# Patient Record
Sex: FEMALE | Race: WHITE | Employment: OTHER | URBAN - METROPOLITAN AREA
[De-identification: names, ages, dates, MRNs, and addresses within clinical notes are randomized per-mention and may not be internally consistent; named-entity substitution may affect disease eponyms.]

---

## 2017-05-03 ENCOUNTER — HOSPITAL ENCOUNTER (OUTPATIENT)
Age: 72
Setting detail: OUTPATIENT SURGERY
Discharge: HOME | End: 2017-05-03
Attending: OPHTHALMOLOGY | Admitting: OPHTHALMOLOGY
Payer: MEDICARE

## 2017-05-03 ENCOUNTER — ANESTHESIA (OUTPATIENT)
Dept: SURGERY | Age: 72
End: 2017-05-03
Payer: MEDICARE

## 2017-05-03 ENCOUNTER — ANESTHESIA EVENT (OUTPATIENT)
Dept: SURGERY | Age: 72
End: 2017-05-03
Payer: MEDICARE

## 2017-05-03 VITALS
HEART RATE: 83 BPM | OXYGEN SATURATION: 97 % | HEIGHT: 65 IN | WEIGHT: 191.5 LBS | TEMPERATURE: 98 F | SYSTOLIC BLOOD PRESSURE: 113 MMHG | RESPIRATION RATE: 16 BRPM | BODY MASS INDEX: 31.9 KG/M2 | DIASTOLIC BLOOD PRESSURE: 45 MMHG

## 2017-05-03 PROCEDURE — 74011250636 HC RX REV CODE- 250/636

## 2017-05-03 PROCEDURE — 77030018606 HC TIP PHACO4 J&J -B: Performed by: OPHTHALMOLOGY

## 2017-05-03 PROCEDURE — 74011250636 HC RX REV CODE- 250/636: Performed by: OPHTHALMOLOGY

## 2017-05-03 PROCEDURE — 76060000031 HC ANESTHESIA FIRST 0.5 HR: Performed by: OPHTHALMOLOGY

## 2017-05-03 PROCEDURE — V2632 POST CHMBR INTRAOCULAR LENS: HCPCS | Performed by: OPHTHALMOLOGY

## 2017-05-03 PROCEDURE — 77030018846 HC SOL IRR STRL H20 ICUM -A: Performed by: OPHTHALMOLOGY

## 2017-05-03 PROCEDURE — 76010000154 HC OR TIME FIRST 0.5 HR: Performed by: OPHTHALMOLOGY

## 2017-05-03 PROCEDURE — 74011000250 HC RX REV CODE- 250: Performed by: OPHTHALMOLOGY

## 2017-05-03 PROCEDURE — 77030006704 HC BLD OPHTH SLT ALCN -B: Performed by: OPHTHALMOLOGY

## 2017-05-03 PROCEDURE — 76210000020 HC REC RM PH II FIRST 0.5 HR: Performed by: OPHTHALMOLOGY

## 2017-05-03 DEVICE — LENS INTRAOCULAR BCNVX +18.0 DIOPT 6X13 MM ASYM ACRYSOF: Type: IMPLANTABLE DEVICE | Site: EYE | Status: FUNCTIONAL

## 2017-05-03 RX ORDER — MIDAZOLAM HYDROCHLORIDE 1 MG/ML
INJECTION, SOLUTION INTRAMUSCULAR; INTRAVENOUS AS NEEDED
Status: DISCONTINUED | OUTPATIENT
Start: 2017-05-03 | End: 2017-05-03 | Stop reason: HOSPADM

## 2017-05-03 RX ORDER — SODIUM CHLORIDE, SODIUM LACTATE, POTASSIUM CHLORIDE, CALCIUM CHLORIDE 600; 310; 30; 20 MG/100ML; MG/100ML; MG/100ML; MG/100ML
75 INJECTION, SOLUTION INTRAVENOUS CONTINUOUS
Status: DISCONTINUED | OUTPATIENT
Start: 2017-05-03 | End: 2017-05-03 | Stop reason: HOSPADM

## 2017-05-03 RX ORDER — SODIUM CHLORIDE, SODIUM LACTATE, POTASSIUM CHLORIDE, CALCIUM CHLORIDE 600; 310; 30; 20 MG/100ML; MG/100ML; MG/100ML; MG/100ML
100 INJECTION, SOLUTION INTRAVENOUS CONTINUOUS
Status: CANCELLED | OUTPATIENT
Start: 2017-05-03

## 2017-05-03 RX ORDER — FENTANYL CITRATE 50 UG/ML
50 INJECTION, SOLUTION INTRAMUSCULAR; INTRAVENOUS
Status: CANCELLED | OUTPATIENT
Start: 2017-05-03

## 2017-05-03 RX ORDER — LIDOCAINE HYDROCHLORIDE 10 MG/ML
INJECTION, SOLUTION EPIDURAL; INFILTRATION; INTRACAUDAL; PERINEURAL AS NEEDED
Status: DISCONTINUED | OUTPATIENT
Start: 2017-05-03 | End: 2017-05-03 | Stop reason: HOSPADM

## 2017-05-03 RX ORDER — ONDANSETRON 2 MG/ML
4 INJECTION INTRAMUSCULAR; INTRAVENOUS ONCE
Status: CANCELLED | OUTPATIENT
Start: 2017-05-03 | End: 2017-05-03

## 2017-05-03 RX ORDER — TROPICAMIDE 10 MG/ML
1 SOLUTION/ DROPS OPHTHALMIC
Status: COMPLETED | OUTPATIENT
Start: 2017-05-03 | End: 2017-05-03

## 2017-05-03 RX ORDER — PHENYLEPHRINE HYDROCHLORIDE 25 MG/ML
1 SOLUTION/ DROPS OPHTHALMIC
Status: COMPLETED | OUTPATIENT
Start: 2017-05-03 | End: 2017-05-03

## 2017-05-03 RX ORDER — EPINEPHRINE 1 MG/ML
INJECTION, SOLUTION, CONCENTRATE INTRAVENOUS AS NEEDED
Status: DISCONTINUED | OUTPATIENT
Start: 2017-05-03 | End: 2017-05-03 | Stop reason: HOSPADM

## 2017-05-03 RX ORDER — NALOXONE HYDROCHLORIDE 0.4 MG/ML
0.4 INJECTION, SOLUTION INTRAMUSCULAR; INTRAVENOUS; SUBCUTANEOUS AS NEEDED
Status: CANCELLED | OUTPATIENT
Start: 2017-05-03

## 2017-05-03 RX ORDER — OXYCODONE AND ACETAMINOPHEN 5; 325 MG/1; MG/1
1 TABLET ORAL AS NEEDED
Status: CANCELLED | OUTPATIENT
Start: 2017-05-03

## 2017-05-03 RX ORDER — FLUMAZENIL 0.1 MG/ML
0.2 INJECTION INTRAVENOUS
Status: CANCELLED | OUTPATIENT
Start: 2017-05-03

## 2017-05-03 RX ADMIN — TROPICAMIDE 1 DROP: 10 SOLUTION/ DROPS OPHTHALMIC at 06:58

## 2017-05-03 RX ADMIN — PHENYLEPHRINE HYDROCHLORIDE 1 DROP: 2.5 SOLUTION/ DROPS OPHTHALMIC at 06:48

## 2017-05-03 RX ADMIN — PHENYLEPHRINE HYDROCHLORIDE 1 DROP: 2.5 SOLUTION/ DROPS OPHTHALMIC at 06:43

## 2017-05-03 RX ADMIN — TROPICAMIDE 1 DROP: 10 SOLUTION/ DROPS OPHTHALMIC at 06:43

## 2017-05-03 RX ADMIN — PHENYLEPHRINE HYDROCHLORIDE 1 DROP: 2.5 SOLUTION/ DROPS OPHTHALMIC at 06:58

## 2017-05-03 RX ADMIN — MIDAZOLAM HYDROCHLORIDE 1 MG: 1 INJECTION, SOLUTION INTRAMUSCULAR; INTRAVENOUS at 08:07

## 2017-05-03 RX ADMIN — LIDOCAINE HYDROCHLORIDE 2 DROP: 35 GEL OPHTHALMIC at 07:56

## 2017-05-03 RX ADMIN — MIDAZOLAM HYDROCHLORIDE 2 MG: 1 INJECTION, SOLUTION INTRAMUSCULAR; INTRAVENOUS at 08:13

## 2017-05-03 RX ADMIN — PHENYLEPHRINE HYDROCHLORIDE 1 DROP: 2.5 SOLUTION/ DROPS OPHTHALMIC at 06:53

## 2017-05-03 RX ADMIN — LIDOCAINE HYDROCHLORIDE 2 DROP: 35 GEL OPHTHALMIC at 06:58

## 2017-05-03 RX ADMIN — TROPICAMIDE 1 DROP: 10 SOLUTION/ DROPS OPHTHALMIC at 06:48

## 2017-05-03 RX ADMIN — TROPICAMIDE 1 DROP: 10 SOLUTION/ DROPS OPHTHALMIC at 06:53

## 2017-05-03 RX ADMIN — SODIUM CHLORIDE, SODIUM LACTATE, POTASSIUM CHLORIDE, AND CALCIUM CHLORIDE 75 ML/HR: 600; 310; 30; 20 INJECTION, SOLUTION INTRAVENOUS at 07:02

## 2017-05-03 RX ADMIN — MIDAZOLAM HYDROCHLORIDE 1 MG: 1 INJECTION, SOLUTION INTRAMUSCULAR; INTRAVENOUS at 08:08

## 2017-05-03 NOTE — ANESTHESIA POSTPROCEDURE EVALUATION
Post-Anesthesia Evaluation and Assessment    Patient: Bell Ness MRN: [id number]  SSN: [id number]    YOB: 1945  Age: 67 y.o. Sex: female       Cardiovascular Function/Vital Signs  Visit Vitals    /54    Pulse 83    Temp 36.8 °C (98.2 °F)    Resp 16    Ht 5' 5\" (1.651 m)    Wt 86.9 kg (191 lb 8 oz)    SpO2 96%    BMI 31.87 kg/m2       Patient is status post MAC anesthesia for Procedure(s):  CATARACT EXTRACTION WITH INTRA OCULAR LENS IMPLANT LEFT EYE. Nausea/Vomiting: None    Postoperative hydration reviewed and adequate. Pain:  Pain Scale 1: Numeric (0 - 10) (05/03/17 0831)  Pain Intensity 1: 0 (05/03/17 0831)   Managed    Neurological Status:   Neuro (WDL): Within Defined Limits (05/03/17 0831)  Neuro  LUE Motor Response: Purposeful (05/03/17 0831)  LLE Motor Response: Purposeful (05/03/17 0831)  RUE Motor Response: Purposeful (05/03/17 0831)  RLE Motor Response: Purposeful (05/03/17 0831)   At baseline    Mental Status and Level of Consciousness: Arousable    Pulmonary Status:   O2 Device: Room air (05/03/17 0845)   Adequate oxygenation and airway patent    Complications related to anesthesia: None    Post-anesthesia assessment completed.  No concerns      Signed By: Papa Jennings CRNA     May 3, 2017

## 2017-05-03 NOTE — PERIOP NOTES
PATIENT SUPPLIED POST-OP DROPS: ILEVRO, BESIVANCE, LOTEMAX 1 GTT EACH left EYE AT END OF CASE  SUNGLASSES APPLIED AT END OF CASE.

## 2017-05-03 NOTE — PERIOP NOTES
AVS med list reviewed and verified - no duplicates with EVITA Branch RN - common med side effects handout to pt - eye bag with 3 eye drops to pts

## 2017-05-03 NOTE — ANESTHESIA POSTPROCEDURE EVALUATION
Post-Anesthesia Evaluation & Assessment    Visit Vitals    /54    Pulse 83    Temp 36.8 °C (98.2 °F)    Resp 16    Ht 5' 5\" (1.651 m)    Wt 86.9 kg (191 lb 8 oz)    SpO2 96%    BMI 31.87 kg/m2       Nausea/Vomiting: Controlled. Post-operative hydration adequate. Pain Scale 1: Numeric (0 - 10) (05/03/17 0831)  Pain Intensity 1: 0 (05/03/17 0831)   Managed    Pain score at or below stated goal level. Mental status & Level of consciousness: alert and oriented x 3    Neurological status: moves all extremities, sensation grossly intact    Pulmonary status: airway patent, adequate oxygenation. Complications related to anesthesia: none    Patient has met all PACU discharge requirements.       Lu Patel DO

## 2017-05-03 NOTE — DISCHARGE INSTRUCTIONS
Post-Operative Cataract Instructions  [facility] INVASIVE SURGERY [facility]  Yessica Shah M.D. Bhavani Nascimento. Bambi Mejia M.D.  [address] Valent[address]ina, [facility]  [phone]      Diet  1. Resume normal diet. 2. Do not drink alcoholic beverages, including beer for 24 hours. Activity  1. Do not drive a car or operate any hazardous machinery the day of surgery. 2. You may resume normal activities as tolerated. 3. No bending or heavy lifting. 4. No reading or computer work after your surgery. You may watch TV. Wound Care  1. Anticipate that your eye will tear and water. 2. You may also experience a sensation of a foreign body, sand, or grit in the surgical eye, this is normal.  3. Do not touch the affected eye. 4. ** Do not remove eye shield unless directed to do so by your physician. **  5. Wear eye shield when resting or sleeping for one week. Medications  1. Take Tylenol Extra Strength two (2) tablets by mouth upon arrival home and then every four (4) hours as needed for discomfort. 2. Regarding Eye drops:  -  Begin using your eye drops as directed by your physician in the (left) operative eye. One drop of     []   Zymar    [x]  Vigamox   along with one (1) drop     []  Acular    [x]  Voltaren   every four (4) hours while awake. Wait five (5) minutes then apply one (1) drop     []  Pred Forte    [x]  Econopred Plus   every four (4) hours while awake. 3. If you take glaucoma medications, continue to do so unless the physician otherwise instructs you. 4. You may use artificial tears as needed if your eye feels scratchy. Notify your Physician Immediately for any of the followin. Excessive pain not relieved by Tylenol especially headache or increasing pressure to the operative eye. 2. Persistent nausea lasting more than eight hours. 3. If any vomiting occurs. If any questions or concerns arise, call your Surgeon at [phone].         DISCHARGE SUMMARY from Nurse    The following personal items are in your possession at time of discharge:    Dental Appliances: None  Visual Aid: None     Home Medications: Kept at bedside (pt supplied euye drops on bed to holding )  Jewelry: Ring (one ring in place on left hand waiver signed )  Clothing: Undergarments, Footwear, Shirt (placed in locker 9)  Other Valuables: None             PATIENT INSTRUCTIONS:    After general anesthesia or intravenous sedation, for 24 hours or while taking prescription Narcotics:  · Limit your activities  · Do not drive and operate hazardous machinery  · Do not make important personal or business decisions  · Do  not drink alcoholic beverages  · If you have not urinated within 8 hours after discharge, please contact your surgeon on call. Report the following to your surgeon:  · Excessive pain, swelling, redness or odor of or around the surgical area  · Temperature over 100.5  · Nausea and vomiting lasting longer than 4 hours or if unable to take medications  · Any signs of decreased circulation or nerve impairment to extremity: change in color, persistent  numbness, tingling, coldness or increase pain  · Any questions        What to do at Home:  Recommended activity: Ambulate in house,     If you experience any of the following symptoms above, please follow up with Dr. Mary Saavedra. *  Please give a list of your current medications to your Primary Care Provider. *  Please update this list whenever your medications are discontinued, doses are      changed, or new medications (including over-the-counter products) are added. *  Please carry medication information at all times in case of emergency situations. These are general instructions for a healthy lifestyle:    No smoking/ No tobacco products/ Avoid exposure to second hand smoke    Surgeon General's Warning:  Quitting smoking now greatly reduces serious risk to your health.     Obesity, smoking, and sedentary lifestyle greatly increases your risk for illness    A healthy diet, regular physical exercise & weight monitoring are important for maintaining a healthy lifestyle    You may be retaining fluid if you have a history of heart failure or if you experience any of the following symptoms:  Weight gain of 3 pounds or more overnight or 5 pounds in a week, increased swelling in our hands or feet or shortness of breath while lying flat in bed. Please call your doctor as soon as you notice any of these symptoms; do not wait until your next office visit. Recognize signs and symptoms of STROKE:    F-face looks uneven    A-arms unable to move or move unevenly    S-speech slurred or non-existent    T-time-call 911 as soon as signs and symptoms begin-DO NOT go       Back to bed or wait to see if you get better-TIME IS BRAIN. Warning Signs of HEART ATTACK     Call 911 if you have these symptoms:   Chest discomfort. Most heart attacks involve discomfort in the center of the chest that lasts more than a few minutes, or that goes away and comes back. It can feel like uncomfortable pressure, squeezing, fullness, or pain.  Discomfort in other areas of the upper body. Symptoms can include pain or discomfort in one or both arms, the back, neck, jaw, or stomach.  Shortness of breath with or without chest discomfort.  Other signs may include breaking out in a cold sweat, nausea, or lightheadedness. Don't wait more than five minutes to call 911 - MINUTES MATTER! Fast action can save your life. Calling 911 is almost always the fastest way to get lifesaving treatment. Emergency Medical Services staff can begin treatment when they arrive -- up to an hour sooner than if someone gets to the hospital by car. Patient armband removed and shredded    The discharge information has been reviewed with the patient and caregiver. The patient and caregiver verbalized understanding.     Discharge medications reviewed with the patient and caregiver and appropriate educational materials and side effects teaching were provided.

## 2017-05-03 NOTE — IP AVS SNAPSHOT
[employer] 
 
 
 [address] [facility] [address] 
[phone] Patient: Ewa Gonzalez MRN: [id number] :1945 You are allergic to the following No active allergies Recent Documentation Height Weight BMI OB Status Smoking Status 1.651 m 86.9 kg 31.87 kg/m2 Postmenopausal Never Smoker Emergency Contacts Name Discharge Info Relation Home Work Mobile Jesse Tamez DISCHARGE CAREGIVER [3] Spouse [3] [phone] About your hospitalization You were admitted on:  May 3, 2017 You last received care in the:  [address] [place] [address] You were discharged on:  May 3, 2017 Unit phone number:  [phone] Why you were hospitalized Your primary diagnosis was:  Not on File Providers Seen During Your Hospitalizations Provider Role Specialty Primary office phone Torres Garner MD Attending Provider Ophthalmology [phone] Your Primary Care Physician (PCP) Primary Care Physician Office Phone Office Fax [address] [facility], [address] [place] [address] [phone] Follow-up Information Follow up With Details Comments Contact Info [place], [address] 
[phone] Torres Garner MD Follow up in 1 day(s)  [address] [facility] [address] [place] [address] 
[phone] Current Discharge Medication List  
  
CONTINUE these medications which have NOT CHANGED Dose & Instructions Dispensing Information Comments Morning Noon Evening Bedtime ADVIL 200 mg tablet Generic drug:  ibuprofen Your last dose was: Your next dose is:    
   
   
 Dose:  400 mg Take 400 mg by mouth daily (with lunch). Refills:  0  
     
   
   
   
  
 cholecalciferol 1,000 unit Cap Commonly known as:  VITAMIN D3 Your last dose was: Your next dose is: Dose:  1000 Units Take 1,000 Units by mouth three (3) times daily. Refills:  0  
     
   
   
   
  
 doxycycline monohydrate 40 mg capsule Your last dose was: Your next dose is:    
   
   
 Dose:  40 mg Take 40 mg by mouth daily (after lunch). Refills:  0  
     
   
   
   
  
 FISH OIL 1,000 mg Cap Generic drug:  omega-3 fatty acids-vitamin e Your last dose was: Your next dose is:    
   
   
 Dose:  1 Cap Take 1 Cap by mouth three (3) times daily. Refills:  0  
     
   
   
   
  
 lisinopril 20 mg tablet Commonly known as:  Rj Rody Your last dose was: Your next dose is:    
   
   
 Dose:  20 mg Take 20 mg by mouth daily. Indications: Hypertension Refills:  0  
     
   
   
   
  
 melatonin 3 mg tablet Your last dose was: Your next dose is:    
   
   
 Dose:  3 mg Take 3 mg by mouth nightly. Refills:  0  
     
   
   
   
  
 meloxicam 15 mg tablet Commonly known as:  MOBIC Your last dose was: Your next dose is:    
   
   
 Dose:  15 mg Take 15 mg by mouth daily. Refills:  0 NORCO 5-325 mg per tablet Generic drug:  HYDROcodone-acetaminophen Your last dose was: Your next dose is:    
   
   
 Dose:  1 Tab Take 1 Tab by mouth as needed for Pain. Refills:  0  
     
   
   
   
  
 raloxifene 60 mg tablet Commonly known as:  EVISTA Your last dose was: Your next dose is:    
   
   
 Dose:  60 mg Take 60 mg by mouth daily. Refills:  0  
     
   
   
   
  
 simvastatin 20 mg tablet Commonly known as:  ZOCOR Your last dose was: Your next dose is:    
   
   
 Dose:  20 mg Take 20 mg by mouth nightly. Indications: hypercholesterolemia Refills:  0  
     
   
   
   
  
 traMADol 50 mg tablet Commonly known as:  ULTRAM  
   
Your last dose was:     
   
Your next dose is:    
   
   
 Dose:  50 mg  
 Take 50 mg by mouth every six (6) hours as needed for Pain. Refills:  0 Discharge Instructions Post-Operative Cataract Instructions [facility] [employer] Maria Luisa Miguel M.D. Shanna Mays. Dianna Evangelista M.D. 
[address] [facility] [address]. [address] 
[phone] Diet 1. Resume normal diet. 2. Do not drink alcoholic beverages, including beer for 24 hours. Activity 1. Do not drive a car or operate any hazardous machinery the day of surgery. 2. You may resume normal activities as tolerated. 3. No bending or heavy lifting. 4. No reading or computer work after your surgery. You may watch TV. Wound Care 1. Anticipate that your eye will tear and water. 2. You may also experience a sensation of a foreign body, sand, or grit in the surgical eye, this is normal. 
3. Do not touch the affected eye. 4. ** Do not remove eye shield unless directed to do so by your physician. ** 
5. Wear eye shield when resting or sleeping for one week. Medications 1. Take Tylenol Extra Strength two (2) tablets by mouth upon arrival home and then every four (4) hours as needed for discomfort. 2. Regarding Eye drops: 
-  Begin using your eye drops as directed by your physician in the (left) operative eye. One drop of        Zymar      Vigamox  
along with one (1) drop       Acular      Voltaren  
every four (4) hours while awake. Wait five (5) minutes then apply one (1) drop       Pred Forte      Econopred Plus  
every four (4) hours while awake. 3. If you take glaucoma medications, continue to do so unless the physician otherwise instructs you. 4. You may use artificial tears as needed if your eye feels scratchy. Notify your Physician Immediately for any of the followin. Excessive pain not relieved by Tylenol especially headache or increasing pressure to the operative eye. 2. Persistent nausea lasting more than eight hours. 3. If any vomiting occurs. If any questions or concerns arise, call your Surgeon at [phone]. DISCHARGE SUMMARY from Nurse The following personal items are in your possession at time of discharge: 
 
Dental Appliances: None Visual Aid: None Home Medications: Kept at bedside (pt supplied euye drops on bed to holding ) Jewelry: Ring (one ring in place on left hand waiver signed ) Clothing: Undergarments, Footwear, Shirt (placed in locker 9) Other Valuables: None PATIENT INSTRUCTIONS: 
 
 
F-face looks uneven A-arms unable to move or move unevenly S-speech slurred or non-existent T-time-call 911 as soon as signs and symptoms begin-DO NOT go Back to bed or wait to see if you get better-TIME IS BRAIN. Warning Signs of HEART ATTACK Call 911 if you have these symptoms: 
? Chest discomfort. Most heart attacks involve discomfort in the center of the chest that lasts more than a few minutes, or that goes away and comes back. It can feel like uncomfortable pressure, squeezing, fullness, or pain. ? Discomfort in other areas of the upper body. Symptoms can include pain or discomfort in one or both arms, the back, neck, jaw, or stomach. ? Shortness of breath with or without chest discomfort. ? Other signs may include breaking out in a cold sweat, nausea, or lightheadedness. Don't wait more than five minutes to call [address]! Fast action can save your life. Calling 911 is almost always the fastest way to get lifesaving treatment. Emergency Medical Services staff can begin treatment when they arrive  up to an hour sooner than if someone gets to the hospital by car. Patient armband removed and shredded The discharge information has been reviewed with the patient and caregiver. The patient and caregiver verbalized understanding. Discharge medications reviewed with the patient and caregiver and appropriate educational materials and side effects teaching were provided. Discharge Orders None Introducing [facility] SERVICES! Angelicacassidy Manuel introduces [employer] patient portal. Now you can access parts of your medical record, email your doctor's office, and request medication refills online. 1. In your internet browser, go to [email or web address] 2. Click on the First Time User? Click Here link in the Sign In box. You will see the New Member Sign Up page. 3. Enter your [employer] Access Code exactly as it appears below. You will not need to use this code after youve completed the sign-up process. If you do not sign up before the expiration date, you must request a new code. · [employer] Access Code: [email or web address] Expires: 7/27/2017  1:20 PM 
 
4. Enter the last four digits of your Social Security Number (xxxx) and Date of Birth (mm/dd/yyyy) as indicated and click Submit. You will be taken to the next sign-up page. 5. Create a [employer] ID. This will be your [employer] login ID and cannot be changed, so think of one that is secure and easy to remember. 6. Create a [employer] password. You can change your password at any time. 7. Enter your Password Reset Question and Answer. This can be used at a later time if you forget your password. 8. Enter your e-mail address. You will receive e-mail notification when new information is available in [address] 9. Click Sign Up. You can now view and download portions of your medical record. 10. Click the Download Summary menu link to download a portable copy of your medical information. If you have questions, please visit the Frequently Asked Questions section of the [employer] website.  Remember, [employer] is NOT to be used for urgent needs. For medical emergencies, dial 911. Now available from your iPhone and Android! General Information Please provide this summary of care documentation to your next provider. Patient Signature:  ____________________________________________________________ Date:  ____________________________________________________________  
  
Norman Mejía Provider Signature:  ____________________________________________________________ Date:  ____________________________________________________________

## 2017-05-03 NOTE — OP NOTES
Cataract Operative Note      Patient: Naida Valdez               Sex: female          DOA: 5/3/2017         YOB: 1945      Age:  67 y.o.        LOS:  LOS: 0 days     Preoperative Diagnosis: Cataract left eye    Postoperative Diagnosis:  Cataract  left eye  Surgeon: Sahra Miller MD, M.D. Anesthesia:  Topical anesthesia  Procedure:  Phacoemulsification of posterior chamber for intraocular lens implantation left    Fluids:  0    Procedure in Detail: The operative eye was prepped and draped in the usual fashion. A lid speculum was placed in the operative eye. A clear cornea approach was utilized. A paracentesis incision(s) was constructed with a 1 mm slit knife. One percent preservative-free lidocaine followed by viscoelastic was instilled into the anterior chamber. A clear corneal incision was made with a slit knife. A continuous curvilinear capsulorrhexis was constructed followed by hydrodissection. A phacoemulsification tip was placed into the eye, and the lens nucleus was emulsified. The irrigation/aspiration device was then used to remove any remaining cortical material.  Polishing of the capsule was performed as needed. The intraocular lens was then placed into the capsular bag after it was re-inflated with viscoelastic. The remaining viscoelastic was then removed using the irrigation/aspiration device. BSS on a cannula was then used to hydrate the wound edges. At the end of the procedure the wound was found to be watertight, the anterior chamber was deep and the pupil round. No blood loss during surgery. An antibiotic and anti-inflammatroy was placed into the operative eye. The lid speculum was removed. Protective sunglasses were then placed onto the patient. The patient was taken to the [address] [place] [address] Unit (PACU) in good condition having tolerated the procedure well. Estimated Blood Loss: 0                 Implants:   Implant Name Type Inv.  Item Serial No.  Lot No. LRB No. Used Action   LENS POST SGL PC 6 13 18.0 -- [employer] - [id number]   LENS POST SGL PC 6 13 18.0 -- [employer] [id number] [employer]   Left 1 Implanted       Specimens: * No specimens in log *            Complications:  None           Fior Nix MD, M.D.  [unfilled]  8:21 AM

## 2017-05-03 NOTE — INTERVAL H&P NOTE
H&P Update:  Elsy Little was seen and examined. History and physical has been reviewed. The patient has been examined.  There have been no significant clinical changes since the completion of the originally dated History and Physical.    Signed By: Shaila Phelan MD     May 3, 2017 7:25 AM

## 2017-05-03 NOTE — ANESTHESIA PREPROCEDURE EVALUATION
Anesthetic History   No history of anesthetic complications            Review of Systems / Medical History  Patient summary reviewed, nursing notes reviewed and pertinent labs reviewed    Pulmonary  Within defined limits                 Neuro/Psych   Within defined limits           Cardiovascular    Hypertension: well controlled          Hyperlipidemia  Pertinent negatives: No past MI, CAD, PAD, dysrhythmias, angina and CHF  Exercise tolerance: >4 METS     GI/Hepatic/Renal  Within defined limits              Endo/Other        Obesity and arthritis  Pertinent negatives: No diabetes, hypothyroidism, hyperthyroidism, morbid obesity and blood dyscrasia   Other Findings              Physical Exam    Airway  Mallampati: III  TM Distance: 4 - 6 cm  Neck ROM: decreased range of motion   Mouth opening: Diminished (comment)     Cardiovascular  Regular rate and rhythm,  S1 and S2 normal,  no murmur, click, rub, or gallop             Dental  No notable dental hx       Pulmonary  Breath sounds clear to auscultation               Abdominal  GI exam deferred       Other Findings            Anesthetic Plan    ASA: 2  Anesthesia type: MAC          Induction: Intravenous  Anesthetic plan and risks discussed with: Patient and Spouse

## 2023-01-20 ENCOUNTER — TELEPHONE (OUTPATIENT)
Dept: PHYSICAL THERAPY | Age: 78
End: 2023-01-20

## 2023-01-20 ENCOUNTER — HOSPITAL ENCOUNTER (OUTPATIENT)
Dept: PHYSICAL THERAPY | Age: 78
Discharge: HOME | End: 2023-01-20
Payer: MEDICARE

## 2023-01-20 PROCEDURE — 97161 PT EVAL LOW COMPLEX 20 MIN: CPT

## 2023-01-20 PROCEDURE — 97535 SELF CARE MNGMENT TRAINING: CPT

## 2023-01-20 PROCEDURE — 97110 THERAPEUTIC EXERCISES: CPT

## 2023-01-20 NOTE — PROGRESS NOTES
[facility] [place], [address] [place] [address]  Phone: [phone]   Fax: [phone]    Plan of Care/ Statement of Necessity for Physical Therapy Services     Patient name: Nishant Rizvi Start of Care: 2023   Referral source: Liat Reagan DO : 1945    Medical Diagnosis: Muscle weakness (generalized) [M62.81]  Other abnormalities of gait and mobility [R26.89]   Onset Date:2022    Treatment Diagnosis: muscle weakness, abnormal gait   Prior Hospitalization: see medical history Provider#: [id number]   Medications: Verified on Patient summary List    Comorbidities: OA, low back pain, right knee pain. Prior Level of Function:  regular 45 minutes walks. The Plan of Care and following information is based on the information from the initial evaluation. Assessment/ key information: Patient presents with c/o recently increasing LE weakness, declining mobility skills and decreasing sense of safety in mobility resulting in frequent \"furniture walking\" in home and decrease in ambulation outside of home due to increased fear of falls. Patient currently exhibits abnormal gait pattern, decreased bilateral LE strength, decreased balance reactions and report of unsteadiness with quick changes in head position. Patient will continue to benefit from skilled PT services to modify and progress therapeutic interventions, address functional mobility deficits, address ROM deficits, address strength deficits, analyze and cue movement patterns, analyze and modify body mechanics/ergonomics, assess and modify postural abnormalities, address imbalance/dizziness and instruct in home and community integration to attain remaining goals.     Evaluation Complexity History MEDIUM  Complexity : 1-2 comorbidities / personal factors will impact the outcome/ POC ; Examination LOW Complexity : 1-2 Standardized tests and measures addressing body structure, function, activity limitation and / or participation in recreation  ;Presentation LOW Complexity : Stable, uncomplicated  ;Clinical Decision Making MEDIUM Complexity : FOTO score of 26-74  Overall Complexity Rating: LOW   Problem List: pain affecting function, decrease ROM, decrease strength, impaired gait/ balance, decrease ADL/ functional abilitiies, decrease activity tolerance, decrease flexibility/ joint mobility, and decrease transfer abilities   Treatment Plan may include any combination of the following: Therapeutic exercise, Neuromuscular reeducation, Therapeutic activity, Self care/home management, and Gait training  Patient / Family readiness to learn indicated by: asking questions, trying to perform skills, and interest  Persons(s) to be included in education: patient (P)  Barriers to Learning/Limitations: None  Measures taken if barriers to learning:   Patient Goal (s): \"I want to be able to walk safely and confidently.   Patient Self Reported Health Status: good  Rehabilitation Potential: good    Short Term Goals: To be accomplished in 4 weeks:   Patient will report compliance with HEP at least 1x/day to aid in rehabilitation program.   Status at IE: Patient instructed in and provided written copy of initial Home Exercise Program.   Current: Same as IE     Patient will decrease TUG by 10 seconds to demonstrate increased safety in mobility. Status at IE: 26.5   Current: Same as IE     Patient will improve 30 second sit to stand score to 8 repetitions to demonstrate increased proximal bilateral LE strength and associated increased safety in mobility. Status at IE: 4 repetitions   Current: Same as IE     Long Term Goals: To be accomplished in 8 weeks:   Patient will increase strength to 4+/5 throughout Bilateral LEs to aid in return recreational activities and ADLs.    Status at IE: left LE 3+/5, right  LE 4-/5   Current: Same as IE    Patient will improve Four Stage Balance Test to Stage Three 10 seconds bilaterally  to demonstrate increased proximal bilateral LE balance capabilities and associated increased safety in mobility. Status at IE: Four Stage Balance Test Stage Three right 2 seconds, left Stage Two 4 seconds. Current: Same as IE     Patient will ambulate 1000ft on level surface with out device and normalized gait. Status at IE:wide based gait reaching for objects for support, decreased jimmy and step length. Current: Same as IE     Patient will improve FOTO (an established functional score where 100 = no disability) to 54 points overall to demonstrate improvement in functional ability. Status at IE:51   Current: Same as IE         Frequency / Duration: Patient to be seen 2 times per week for 8 weeks. Patient/ Caregiver education and instruction: Diagnosis, prognosis, self care, activity modification, and exercises   [x]  Plan of care has been reviewed with PTA    Certification Period: 1/20/2023 to 4/16/2023    Luis Morgan, PT 1/20/2023 12:57 PM  _____________________________________________________________________  I certify that the above Therapy Services are being furnished while the patient is under my care. I agree with the treatment plan and certify that this therapy is necessary.     Physician's Signature:____________Date:_________TIME:________                                      Claudeen Ruder, DO    ** Signature, Date and Time must be completed for valid certification **    Please sign and return to [facility] at [address]  Phone: [phone]   Fax: [phone]

## 2023-01-20 NOTE — PROGRESS NOTES
PT DAILY TREATMENT NOTE/NEURO EVAL 10-18    Patient Name: Kennedy Hebert  Date:2023  : 1945  [x]  Patient  Verified  Payor: [place] MEDICARE / Plan: [place] MEDICARE PART A & B / Product Type: Medicare /    In time:1108  Out time:1204  Total Treatment Time (min): 40  Visit #: 1 of 16    Medicare/BCBS Only   Total Timed Codes (min):  40 1:1 Treatment Time:  40     Treatment Area: Muscle weakness (generalized) [M62.81]  Other abnormalities of gait and mobility [R26.89]    SUBJECTIVE  Pain Level (0-10 scale): 4/10  []constant []intermittent []improving [x]worsening []no change since onset    Any medication changes, allergies to medications, adverse drug reactions, diagnosis change, or new procedure performed?: [x] No    [] Yes (see summary sheet for update)  Subjective functional status/changes:     Patient has c/c of decreasing balance and progressive weakness over past year with no specific reason for onset. Patient referred to PT by primary provider to instruct in program to maximize safety in mobility. Patient does also report chronic low back pain and regular use of Tramadol. Patient describes pain as strong lumbar ache. Pain is worse in PM. Denies numbness/tingling. Denies popping/clicking. Aggravating factors:weight bearing activities. Alleviating factors: rest, medication. Denies red flags: SOB, chest pain, dizziness/lightheadedness, blurred/double vision, HA, chills/fevers, night sweats, change in bowel/bladder control, abdominal pain, difficulty swallowing, slurred speech, unexplained weight gain/loss, nausea, vomiting. PMHx: OA, low back pain, right knee pain. Surgical Hx: trigger finger surgery. Social Hx: , two level home, no alcohol, no tobacco. PLOF: regular 45 minutes walks. CLOF: walking tolerance <20 minutes, tends to furniture walk through house.   Diagnostic Imaging: right knee severe OA      OBJECTIVE/EXAMINATION    Precautions: Fall risk    16 min [x]Eval                  []Re-Eval 30 min Therapeutic Exercise:  [x]  See flow sheet :   Rationale: improve coordination, improve balance and increase proprioception  to improve the patients ability to complete ADLs and decrease falls risk    10 min Self Care: Instruction in modifications to household mobility and household ADLs to maximize safety in ADLs. Rationale: Instruction to increase ROM and strength and functional activity tolerance to improve the patients ability to perform self care and household activities without exacerbating pain intensity. With   [] TE   [] TA   [] neuro   [] other: Patient Education: [x] Review HEP    [] Progressed/Changed HEP based on:   [] positioning   [] body mechanics   [] transfers   [] heat/ice application    [] other:      Physical Therapy Evaluation - Neurologic    Posture: [] Poor    [x] Fair    [] Good    Describe:       Gait: [] Normal    [x] Abnormal    Device:  none    Describe: antalgic right LE, with decreased weight shift and decreased stance time right LE. Gait is wide based with patient frequently reaching for objects for support, decreased jimmy and decreased step length. UE ROM/Strength:           AROM: WNL  UE strength 4/5    LE ROM/Strength         AROM: WNL  Left LE strength:3+/5  Right LE strength 4-/5        Tone: WNL    Motor Control: Mild difficulty heel to shin test. Mild difficulty finger to nose test eyes closed. Equilibrium: Reports onset of dizziness with quick head turns. Other:       Impaired Judgement: [] Y    [x] N      Impaired Vision:  [] Y    [x] N      Safety Awareness Deficits  [] Y    [x] N      Impaired Hearing  [] Y    [x] N      Able to Express Needs [x] Y    [] N        Other test /comments:  TU.5 seconds  30 Second Sit<>Stand: 4 repetitions  Four Stage Balance Test: Left Stage Two 4 seconds, Right Stage Three 2 seconds.      Pain Level (0-10 scale) post treatment: 0-4    ASSESSMENT/Changes in Function: Patient presents with c/o recently increasing LE weakness, declining mobility skills and decreasing sense of safety in mobility resulting in frequent \"furniture walking\" in home and decrease in ambulation outside of home due to increased fear of falls. Patient currently exhibits abnormal gait pattern, decreased bilateral LE strength, decreased balance reactions and report of unsteadiness with quick changes in head position. Patient will continue to benefit from skilled PT services to modify and progress therapeutic interventions, address functional mobility deficits, address ROM deficits, address strength deficits, analyze and cue movement patterns, analyze and modify body mechanics/ergonomics, assess and modify postural abnormalities, address imbalance/dizziness and instruct in home and community integration to attain remaining goals.      [x]  See Plan of Care  []  See progress note/recertification  []  See Discharge Summary         Progress towards goals / Updated goals:  See POC    PLAN  []  Upgrade activities as tolerated     [x]  Continue plan of care  []  Update interventions per flow sheet       []  Discharge due to:_  []  Other:_      Luis Morgan, PT 1/20/2023  11:10 AM

## 2023-01-24 ENCOUNTER — HOSPITAL ENCOUNTER (OUTPATIENT)
Dept: PHYSICAL THERAPY | Age: 78
Discharge: HOME | End: 2023-01-24
Payer: MEDICARE

## 2023-01-24 PROCEDURE — 97112 NEUROMUSCULAR REEDUCATION: CPT

## 2023-01-24 PROCEDURE — 97110 THERAPEUTIC EXERCISES: CPT

## 2023-01-24 NOTE — PROGRESS NOTES
PT DAILY TREATMENT NOTE    Patient Name: Branden Kellogg  Date:2023  : 1945  [x]  Patient  Verified  Payor: [place] MEDICARE / Plan: [place] MEDICARE PART A & B / Product Type: Medicare /    In time:10:00  Out time:10:30  Total Treatment Time (min): 30  Total Timed Codes (min): 30  1:1 Treatment Time ([facility]/BCBS only): 30   Visit #: 2 of 16    Treatment Dx: Muscle weakness (generalized) [M62.81]  Other abnormalities of gait and mobility [R26.89]    SUBJECTIVE  Pain Level (0-10 scale): 0 \"thanks to [facility]"  Any medication changes, allergies to medications, adverse drug reactions, diagnosis change, or new procedure performed?: [x] No    [] Yes (see summary sheet for update)  Subjective functional status/changes:   [] No changes reported    Patient reports she was unaware that her right leg was so weak and unbalanced until her PT evaluation. OBJECTIVE         15 min Therapeutic Exercise:  [x]  See flow sheet :   Rationale: improve coordination, improve balance and increase proprioception  to improve the patients ability to complete ADLs and decrease falls risk     15 min Neuromuscular Re-education:  []  See flow sheet :   Rationale: improve coordination, improve balance and increase proprioception  to improve the patients ability to complete ADLs and decrease falls risk          With   [] TE   [] TA   [] neuro   [] other: Patient Education: [x] Review HEP    [] Progressed/Changed HEP based on:   [] positioning   [] body mechanics   [] transfers   [] heat/ice application    [] other:      Other Objective/Functional Measures: see goals     Pain Level (0-10 scale) post treatment: 0    ASSESSMENT/Changes in Function:  Patient reports no adverse reactions to therapy. Skilled therapy intervention addressed appropriate balance support during standing exercises and LE strengthening with balance training/ awareness. Patient demonstrates mild memory impairments with anxiety.  Suggested to count exercises out loud,  be more mindful of weight-shifting during performance, and deep breathing to address both with improved performance following. Required light bilateral UE assist during balance exercises. Patient is making good progress towards goals and will benefit from continued therapy to achieve goals and maximize function/restore PLOF. Patient will continue to benefit from skilled PT services to modify and progress therapeutic interventions, address functional mobility deficits, address ROM deficits, address strength deficits, analyze and address soft tissue restrictions, analyze and cue movement patterns, analyze and modify body mechanics/ergonomics, assess and modify postural abnormalities, address imbalance/dizziness, and instruct in home and community integration to attain remaining goals. [x]  See Plan of Care  []  See progress note/recertification  []  See Discharge Summary         Progress towards goals / Updated goals:  Short Term Goals: To be accomplished in 4 weeks:                   Patient will report compliance with HEP at least 1x/day to aid in rehabilitation program.                   Status at IE: Patient instructed in and provided written copy of initial Home Exercise Program.                   Current: 1/24/23: Adherent Saturday and Monday, not Sunday due to busy schedule                      Patient will decrease TUG by 10 seconds to demonstrate increased safety in mobility. Status at IE: 26.5                   Current: Same as IE                      Patient will improve 30 second sit to stand score to 8 repetitions to demonstrate increased proximal bilateral LE strength and associated increased safety in mobility. Status at IE: 4 repetitions                   Current: Same as IE     Long Term Goals: To be accomplished in 8 weeks:                   Patient will increase strength to 4+/5 throughout Bilateral LEs to aid in return recreational activities and ADLs. Status at IE: left LE 3+/5, right  LE 4-/5                   Current: Same as IE     Patient will improve Four Stage Balance Test to Stage Three 10 seconds bilaterally  to demonstrate increased proximal bilateral LE balance capabilities and associated increased safety in mobility. Status at IE: Four Stage Balance Test Stage Three right 2 seconds, left Stage Two 4 seconds. Current: Same as IE                      Patient will ambulate 1000ft on level surface with out device and normalized gait. Status at IE:wide based gait reaching for objects for support, decreased jimmy and step length. Current: Same as IE                      Patient will improve FOTO (an established functional score where 100 = no disability) to 54 points overall to demonstrate improvement in functional ability. Status at IE:51                   Current: Same as IE       PLAN  []  Upgrade activities as tolerated     [x]  Continue plan of care  []  Update interventions per flow sheet       []  Discharge due to:_  []  Other:_      Tristen Winn, PT 1/24/2023  9:47 AM    No future appointments.

## 2023-01-27 ENCOUNTER — HOSPITAL ENCOUNTER (OUTPATIENT)
Dept: PHYSICAL THERAPY | Age: 78
Discharge: HOME | End: 2023-01-27
Payer: MEDICARE

## 2023-01-27 PROCEDURE — 97110 THERAPEUTIC EXERCISES: CPT

## 2023-01-27 PROCEDURE — 97112 NEUROMUSCULAR REEDUCATION: CPT

## 2023-01-27 NOTE — PROGRESS NOTES
PT DAILY TREATMENT NOTE - [facility]     Patient Name: Lucita Lyon  Date:2023  : 1945  [x]  Patient  Verified  Payor: Maria Fernanda Whitaker / Plan: [place] MEDICARE PART A & B / Product Type: Medicare /    In time:1000  Out time:1030  Total Treatment Time (min): 30  Total Timed Codes (min): 30   1:1 Treatment Time ( [address] only): 30   Visit #: 3 of 16    Treatment Area: Muscle weakness (generalized) [M62.81]  Other abnormalities of gait and mobility [R26.89]    SUBJECTIVE  Pain Level (0-10 scale): 1  Any medication changes, allergies to medications, adverse drug reactions, diagnosis change, or new procedure performed?: [x] No    [] Yes (see summary sheet for update)  Subjective functional status/changes:   [] No changes reported    Patient reports that she is stiff and sore today. She reports compliance with HEP. OBJECTIVE    15 min Therapeutic Exercise:  [x] See flow sheet :   Rationale: increase ROM, increase strength and decrease pain to improve the patients ability to complete ADLs     15 min Neuromuscular Re-education:  [x]  See flow sheet :   Rationale: improve coordination, improve balance and increase proprioception  to improve the patients ability to complete ADLs            With   [x] TE   [] TA   [] neuro   [] other: Patient Education: [x] Review HEP    [] Progressed/Changed HEP based on:   [] positioning   [] body mechanics   [] transfers   [] heat/ice application    [] other:      Other Objective/Functional Measures: NA     Pain Level (0-10 scale) post treatment: 0    ASSESSMENT/Changes in Function: Patient responds well to treatment session. Patient demonstrated improved activity tolerance increased intensity via reps and sets. Introduced log rolling to promote spine safety. Plaintext demonstrated strong fear avoidance behavior when log rolling as she is worried about falling off of the treatment table. Will continue to develop confidence and mechanics with the activity.  No adverse effects were noted from today's treatment session. Patient will continue to benefit from skilled PT services to modify and progress therapeutic interventions, address functional mobility deficits, address ROM deficits, address strength deficits, analyze and address soft tissue restrictions, analyze and cue movement patterns, analyze and modify body mechanics/ergonomics, assess and modify postural abnormalities, address imbalance and instruct in home and community integration to attain remaining goals. []  See Plan of Care  []  See progress note/recertification  []  See Discharge Summary         Progress towards goals / Updated goals:  Short Term Goals: To be accomplished in 4 weeks:                   Patient will report compliance with HEP at least 1x/day to aid in rehabilitation program.                   Status at IE: Patient instructed in and provided written copy of initial Home Exercise Program.                   Current: 1/24/23: Adherent Saturday and Monday, not Sunday due to busy schedule                      Patient will decrease TUG by 10 seconds to demonstrate increased safety in mobility. Status at IE: 26.5                   Current: Same as IE                      Patient will improve 30 second sit to stand score to 8 repetitions to demonstrate increased proximal bilateral LE strength and associated increased safety in mobility. Status at IE: 4 repetitions                   Current: Same as IE     Long Term Goals: To be accomplished in 8 weeks:                   Patient will increase strength to 4+/5 throughout Bilateral LEs to aid in return recreational activities and ADLs. Status at IE: left LE 3+/5, right  LE 4-/5                   Current: Same as IE     Patient will improve Four Stage Balance Test to Stage Three 10 seconds bilaterally  to demonstrate increased proximal bilateral LE balance capabilities and associated increased safety in mobility. Status at IE: Four Stage Balance Test Stage Three right 2 seconds, left Stage Two 4 seconds. Current: Same as IE                      Patient will ambulate 1000ft on level surface with out device and normalized gait. Status at IE:wide based gait reaching for objects for support, decreased jimmy and step length. Current: Same as IE                      Patient will improve FOTO (an established functional score where 100 = no disability) to 54 points overall to demonstrate improvement in functional ability.                    Status at IE:51                   Current: Same as IE    PLAN  []  Upgrade activities as tolerated     [x]  Continue plan of care  []  Update interventions per flow sheet       []  Discharge due to:_  []  Other:_      Enid Alpers, PT, DPT 1/27/2023  10:12 AM    Future Appointments   Date Time Provider Samantha Nelson   1/31/2023 11:00 AM Yolanda Milian PT [facility]   2/2/2023 10:00 AM Jonathan Trujillo PT [facility]   2/7/2023 11:00 AM Harlan Thomas PT [facility]   2/9/2023 12:00 PM Harlan Thomas PT [facility]   2/14/2023 11:00 AM Harlan Thomas PT [facility] [place]   2/16/2023 11:00 AM Harlan Thomas PT [facility]   2/21/2023 11:00 AM Harlan Thomas PT [facility]   2/24/2023 10:30 AM Jonathan Trujillo PT [facility]   2/28/2023  2:30 PM Harlan Thomas PT [facility]

## 2023-01-31 ENCOUNTER — HOSPITAL ENCOUNTER (OUTPATIENT)
Dept: PHYSICAL THERAPY | Age: 78
Discharge: HOME | End: 2023-01-31
Payer: MEDICARE

## 2023-01-31 PROCEDURE — 97110 THERAPEUTIC EXERCISES: CPT

## 2023-01-31 PROCEDURE — 97530 THERAPEUTIC ACTIVITIES: CPT

## 2023-01-31 PROCEDURE — 97112 NEUROMUSCULAR REEDUCATION: CPT

## 2023-01-31 NOTE — PROGRESS NOTES
PT DAILY TREATMENT NOTE - [facility]     Patient Name: Denia Mars  Date:2023  : 1945  [x]  Patient  Verified  Payor: Robert Brooks / Plan: [place] / Product Type: Medicare /    In time:1100  Out time:1140  Total Treatment Time (min): 40  Total Timed Codes (min): 40   1:1 Treatment Time ([address] only): 40   Visit #: 4  16    Treatment Area: Muscle weakness (generalized) [M62.81]  Other abnormalities of gait and mobility [R26.89]    SUBJECTIVE  Pain Level (0-10 scale): 0  Any medication changes, allergies to medications, adverse drug reactions, diagnosis change, or new procedure performed?: [x] No    [] Yes (see summary sheet for update)  Subjective functional status/changes:   [] No changes reported    Patient reports that she is feeling better and is compliant with initial HEP. OBJECTIVE    22 min Therapeutic Exercise:  [x] See flow sheet :   Rationale: increase ROM, increase strength and decrease pain to improve the patients ability to complete ADLs    10 min Therapeutic Activity:  [x]  See flow sheet :   Rationale: increase ROM, increase strength and improve coordination  to improve the patients ability to complete ADLs     8 min Neuromuscular Re-education:  [x]  See flow sheet :   Rationale: improve coordination, improve balance and increase proprioception  to improve the patients ability to complete ADLs        With   [x] TE   [] TA   [] neuro   [] other: Patient Education: [x] Review HEP    [] Progressed/Changed HEP based on:   [] positioning   [] body mechanics   [] transfers   [] heat/ice application    [] other:      Other Objective/Functional Measures: NA     Pain Level (0-10 scale) post treatment: 0    ASSESSMENT/Changes in Function: Patient responds well to treatment session. Patient demonstrated improved activity tolerance. Advanced exercise by increasing repetitions with STS. She was challenged with exercise prescribed.  Patient required steady cues throughout treatment to recall exercise parameters. No adverse effects were noted from today's treatment session      Patient will continue to benefit from skilled PT services to modify and progress therapeutic interventions, address functional mobility deficits, address ROM deficits, address strength deficits, analyze and address soft tissue restrictions, analyze and cue movement patterns, analyze and modify body mechanics/ergonomics, assess and modify postural abnormalities, address imbalance and instruct in home and community integration to attain remaining goals. []  See Plan of Care  []  See progress note/recertification  []  See Discharge Summary         Progress towards goals / Updated goals:  Short Term Goals: To be accomplished in 4 weeks:                   Patient will report compliance with HEP at least 1x/day to aid in rehabilitation program.                   Status at IE: Patient instructed in and provided written copy of initial Home Exercise Program.                   Current: 1/24/23: Adherent Saturday and Monday, not Sunday due to busy schedule                      Patient will decrease TUG by 10 seconds to demonstrate increased safety in mobility. Status at IE: 26.5                   Current: Same as IE                     Patient will improve 30 second sit to stand score to 8 repetitions to demonstrate increased proximal bilateral LE strength and associated increased safety in mobility. Status at IE: 4 repetitions                   Current: In-progress, 4/5 STS 1/31/2023    Long Term Goals: To be accomplished in 8 weeks:                   Patient will increase strength to 4+/5 throughout Bilateral LEs to aid in return recreational activities and ADLs.                    Status at IE: left LE 3+/5, right  LE 4-/5                   Current: Same as IE     Patient will improve Four Stage Balance Test to Stage Three 10 seconds bilaterally  to demonstrate increased proximal bilateral LE balance capabilities and associated increased safety in mobility. Status at IE: Four Stage Balance Test Stage Three right 2 seconds, left Stage Two 4 seconds. Current: Same as IE                      Patient will ambulate 1000ft on level surface with out device and normalized gait. Status at IE:wide based gait reaching for objects for support, decreased jimmy and step length. Current: Same as IE                      Patient will improve FOTO (an established functional score where 100 = no disability) to 54 points overall to demonstrate improvement in functional ability.                    Status at IE:51                   Current: Same as IE       PLAN  []  Upgrade activities as tolerated     [x]  Continue plan of care  []  Update interventions per flow sheet       []  Discharge due to:_  []  Other:_      Sky Mclaughlin PT, DPT 1/31/2023  12:10 PM    Future Appointments   Date Time Provider Samantha Nelson   2/2/2023 10:00 AM Cassandra Belcher [facility]   2/7/2023 11:00 AM Lukas Labor, PT [facility]   2/9/2023 12:00 PM Lukas Labor, PT [facility]   2/14/2023 11:00 AM Lukas Labor, PT [facility]   2/16/2023 11:00 AM Lukas Labor, PT [facility]   2/21/2023 11:00 AM Lukas Labor, PT [facility]   2/24/2023 10:30 AM Jose Jameson PT [facility] [place] [facility]   2/28/2023  2:30 PM Lukas Labor, PT [facility]

## 2023-02-02 ENCOUNTER — HOSPITAL ENCOUNTER (OUTPATIENT)
Dept: PHYSICAL THERAPY | Age: 78
Discharge: HOME | End: 2023-02-02
Payer: MEDICARE

## 2023-02-02 PROCEDURE — 97530 THERAPEUTIC ACTIVITIES: CPT

## 2023-02-02 PROCEDURE — 97110 THERAPEUTIC EXERCISES: CPT

## 2023-02-02 PROCEDURE — 97112 NEUROMUSCULAR REEDUCATION: CPT

## 2023-02-02 NOTE — PROGRESS NOTES
PT DAILY TREATMENT NOTE    Patient Name: Milton Youngblood  Date:2023  : 1945  [x]  Patient  Verified  Payor: [place] MEDICARE / Plan: [place] MEDICARE PART A & B / Product Type: Medicare /    In time: 1003  Out time: 8185  Total Treatment Time (min): 46  Total Timed Codes (min): 46  1:1 Treatment Time ([facility] only): 46   Visit #: 5 of 16    Treatment Dx: Muscle weakness (generalized) [M62.81]  Other abnormalities of gait and mobility [R26.89]    SUBJECTIVE  Pain Level (0-10 scale): 3  Any medication changes, allergies to medications, adverse drug reactions, diagnosis change, or new procedure performed?: [x] No    [] Yes (see summary sheet for update)  Subjective functional status/changes:   [] No changes reported  \"My biggest challenge is getting in and out of bed. It really hurts my back. I need to learn a better way to do it. \"    OBJECTIVE    16 min Therapeutic Exercise:  [x] See flow sheet :   Rationale: increase ROM, increase strength and decrease pain to improve the patients ability to complete ADLs  ambulation safety and efficiency in order to improve patient's ability to safely ambulate at home for self care.         15 min Therapeutic Activity:  [x]  See flow sheet :   Rationale: increase ROM, increase strength and improve coordination  to improve the patients ability to Complete ADLS     15 min Neuromuscular Re-education:  [x]  See flow sheet :   Rationale: improve coordination, improve balance and increase proprioception  to improve the patients ability to complete ADLS, and decrease falls risk    With   [] TE   [] TA   [] neuro   [] other: Patient Education: [x] Review HEP    [] Progressed/Changed HEP based on:   [] positioning   [] body mechanics   [] transfers   [] heat/ice application    [] other:      Other Objective/Functional Measures: NA     Pain Level (0-10 scale) post treatment: 0    ASSESSMENT/Changes in Function: Patient instructed in log rolling technique and optimal body mechanics for sit to supine transfers and provided written coy of optimal technique. Also provided written copy of additions to HEP. Patient responds well to treatment session. No adverse effects were noted from today's treatment session. Patient will continue to benefit from skilled PT services to modify and progress therapeutic interventions, address functional mobility deficits, address ROM deficits, address strength deficits, analyze and address soft tissue restrictions, analyze and cue movement patterns, analyze and modify body mechanics/ergonomics, assess and modify postural abnormalities,  and instruct in home and community integration to attain remaining goals. [x]  See Plan of Care  []  See progress note/recertification  []  See Discharge Summary         Progress towards goals / Updated goals:  Short Term Goals: To be accomplished in 4 weeks:                   Patient will report compliance with HEP at least 1x/day to aid in rehabilitation program.                   Status at IE: Patient instructed in and provided written copy of initial Home Exercise Program.                   Current: 1/24/23: Adherent Saturday and Monday, not Sunday due to busy schedule                      Patient will decrease TUG by 10 seconds to demonstrate increased safety in mobility. Status at IE: 26.5 seconds                   Current: 24.3 seconds. In-progress, 2/2/2023                      Patient will improve 30 second sit to stand score to 8 repetitions to demonstrate increased proximal bilateral LE strength and associated increased safety in mobility. Status at IE: 4 repetitions                   Current: In-progress, 4/5 STS 1/31/2023     Long Term Goals: To be accomplished in 8 weeks:                   Patient will increase strength to 4+/5 throughout Bilateral LEs to aid in return recreational activities and ADLs.                    Status at IE: left LE 3+/5, right  LE 4-/5 Current: Same as IE     Patient will improve Four Stage Balance Test to Stage Three 10 seconds bilaterally  to demonstrate increased proximal bilateral LE balance capabilities and associated increased safety in mobility. Status at IE: Four Stage Balance Test Stage Three right 2 seconds, left Stage Two 4 seconds. Current: Same as IE                      Patient will ambulate 1000ft on level surface with out device and normalized gait. Status at IE:wide based gait reaching for objects for support, decreased jimmy and step length. Current: Same as IE                      Patient will improve FOTO (an established functional score where 100 = no disability) to 54 points overall to demonstrate improvement in functional ability.                    Status at IE:51                   Current: Same as IE    PLAN  []  Upgrade activities as tolerated     [x]  Continue plan of care  []  Update interventions per flow sheet       []  Discharge due to:_  []  Other:_      Gita Waller PT 2/2/2023  10:30 AM    Future Appointments   Date Time Provider Samantha Nelson   2/7/2023 11:00 AM Cassandra Mccabe [facility]   2/9/2023 12:00 PM [facility]   2/14/2023 11:00 AM Marita Blanco PT [facility]   2/16/2023 11:00 AM [facility]   2/21/2023 11:00 AM [facility]I [facility]   2/24/2023 10:30 AM JAVI SolisHPTVINCE [facility]   2/28/2023  2:30 PM [facility]I [facility]

## 2023-02-07 ENCOUNTER — APPOINTMENT (OUTPATIENT)
Dept: PHYSICAL THERAPY | Age: 78
End: 2023-02-07
Payer: MEDICARE

## 2023-02-09 ENCOUNTER — HOSPITAL ENCOUNTER (OUTPATIENT)
Dept: PHYSICAL THERAPY | Age: 78
Discharge: HOME | End: 2023-02-09
Payer: MEDICARE

## 2023-02-09 PROCEDURE — 97530 THERAPEUTIC ACTIVITIES: CPT

## 2023-02-09 PROCEDURE — 97112 NEUROMUSCULAR REEDUCATION: CPT

## 2023-02-09 PROCEDURE — 97110 THERAPEUTIC EXERCISES: CPT

## 2023-02-09 NOTE — PROGRESS NOTES
PT DAILY TREATMENT NOTE    Patient Name: Jojo Costa  Date:2023  : 1945  [x]  Patient  Verified  Payor: Addie Vigil / Plan: [place] MEDICARE PART A & B / Product Type: Medicare /    In time:12:05  Out time:12:44  Total Treatment Time (min): 39  Total Timed Codes (min): 39  1:1 Treatment Time ([facility] only): 39   Visit #: 6 of 16    Treatment Dx: Muscle weakness (generalized) [M62.81]  Other abnormalities of gait and mobility [R26.89]    SUBJECTIVE  Pain Level (0-10 scale): 3  Any medication changes, allergies to medications, adverse drug reactions, diagnosis change, or new procedure performed?: [x] No    [] Yes (see summary sheet for update)  Subjective functional status/changes:   [] No changes reported    Patient reports she may have a chronic ear infection that could be affecting her balance. She is visiting her doctor today for follow-up. OBJECTIVE    17 min Therapeutic Exercise:  [x] See flow sheet :   Rationale: increase ROM, increase strength and decrease pain to improve the patients ability to complete ADLs  ambulation safety and efficiency in order to improve patient's ability to safely ambulate at home for self care.                               10 min Therapeutic Activity:  [x]  See flow sheet :   Rationale: increase ROM, increase strength and improve coordination  to improve the patients ability to Complete ADLS     12 min Neuromuscular Re-education:  [x]  See flow sheet :   Rationale: improve coordination, improve balance and increase proprioception  to improve the patients ability to complete ADLS, and decrease falls risk          With   [] TE   [] TA   [] neuro   [] other: Patient Education: [x] Review HEP    [] Progressed/Changed HEP based on:   [] positioning   [] body mechanics   [] transfers   [] heat/ice application    [] other:      Other Objective/Functional Measures: see goals     Pain Level (0-10 scale) post treatment: 0    ASSESSMENT/Changes in Function: Patient reports no adverse reactions to therapy. Skilled therapy intervention addressed dynamic balance. Requires light but nearly continuous external assist during balance exercises. Challenged by looking straight ahead during stair step exercise due to decreased spatial awareness with increased toe catching on steps. Progressed LE resistance for strengthening. Patient is making good progress towards goals and will benefit from continued therapy to achieve goals and maximize function/restore PLOF. Patient will continue to benefit from skilled PT services to modify and progress therapeutic interventions, address functional mobility deficits, address ROM deficits, address strength deficits, analyze and address soft tissue restrictions, analyze and cue movement patterns, analyze and modify body mechanics/ergonomics, assess and modify postural abnormalities,  and instruct in home and community integration to attain remaining goals. [x]  See Plan of Care  []  See progress note/recertification  []  See Discharge Summary         Progress towards goals / Updated goals:  Short Term Goals: To be accomplished in 4 weeks:                   Patient will report compliance with HEP at least 1x/day to aid in rehabilitation program.                   Status at IE: Patient instructed in and provided written copy of initial Home Exercise Program.                   Current: 2/9/23: reports consistency and improved time for SLS                      Patient will decrease TUG by 10 seconds to demonstrate increased safety in mobility. Status at IE: 26.5 seconds                   Current: 24.3 seconds. In-progress, 2/2/2023                      Patient will improve 30 second sit to stand score to 8 repetitions to demonstrate increased proximal bilateral LE strength and associated increased safety in mobility. Status at IE: 4 repetitions                   Current:   In-progress, 4/5 STS 1/31/2023     Long Term Goals: To be accomplished in 8 weeks:                   Patient will increase strength to 4+/5 throughout Bilateral LEs to aid in return recreational activities and ADLs. Status at IE: left LE 3+/5, right  LE 4-/5                   Current: Same as IE     Patient will improve Four Stage Balance Test to Stage Three 10 seconds bilaterally  to demonstrate increased proximal bilateral LE balance capabilities and associated increased safety in mobility. Status at IE: Four Stage Balance Test Stage Three right 2 seconds, left Stage Two 4 seconds. Current: Same as IE                      Patient will ambulate 1000ft on level surface with out device and normalized gait. Status at IE:wide based gait reaching for objects for support, decreased jimmy and step length. Current: Same as IE                      Patient will improve FOTO (an established functional score where 100 = no disability) to 54 points overall to demonstrate improvement in functional ability.                    Status at IE:51                   Current: Same as IE    PLAN  []  Upgrade activities as tolerated     [x]  Continue plan of care  []  Update interventions per flow sheet       []  Discharge due to:_  []  Other:_      Nixon Ribera, PT 2/9/2023  8:50 AM    Future Appointments   Date Time Provider Samantha Nelson   2/9/2023 12:00 PM Nancy Patrick PT [facility]   2/14/2023 11:00 AM Nancy Patrick PT [facility]   2/16/2023 11:00 AM Nancy Patrick PT [facility]   2/21/2023 11:00 AM Nancy Patrick PT [facility]   2/24/2023 10:30 AM Alicia Adorno PT [facility]   2/28/2023  2:30 PM Nancy Patrick PT [facility]

## 2023-02-14 ENCOUNTER — APPOINTMENT (OUTPATIENT)
Dept: PHYSICAL THERAPY | Age: 78
End: 2023-02-14
Payer: MEDICARE

## 2023-02-14 ENCOUNTER — APPOINTMENT (OUTPATIENT)
Facility: HOSPITAL | Age: 78
End: 2023-02-14
Payer: MEDICARE

## 2023-02-16 ENCOUNTER — HOSPITAL ENCOUNTER (OUTPATIENT)
Facility: HOSPITAL | Age: 78
Setting detail: RECURRING SERIES
Discharge: HOME | End: 2023-02-19
Payer: MEDICARE

## 2023-02-16 ENCOUNTER — APPOINTMENT (OUTPATIENT)
Dept: PHYSICAL THERAPY | Age: 78
End: 2023-02-16
Payer: MEDICARE

## 2023-02-16 PROCEDURE — 97112 NEUROMUSCULAR REEDUCATION: CPT

## 2023-02-16 PROCEDURE — 97530 THERAPEUTIC ACTIVITIES: CPT

## 2023-02-16 NOTE — PROGRESS NOTES
PHYSICAL / OCCUPATIONAL THERAPY - DAILY TREATMENT NOTE (updated )    Patient Name: Hattie Bland    Date: 2023    : 1945  Insurance: Payor: MEDICARE / Plan: MEDICARE PART A AND B / Product Type: *No Product type* /      Patient  verified Yes     Visit #   Current / Total 7 16   Time   In / Out 11:00 11:34   Pain   In / Out 2 2   Subjective Functional Status/Changes: Patient reports she had to cancel her last session due to an ENT appointment and was happy with her experience. Changes to:  Meds, Allergies, Med Hx, Sx Hx? If yes, update Summary List no       TREATMENT AREA =  No admission diagnoses are documented for this encounter. OBJECTIVE         Therapeutic Procedures: Tx Min Billable or 1:1 Min (if diff from Tx Min) Procedure, Rationale, Specifics   20  [id number] Therapeutic Activity (timed):  use of dynamic activities replicating functional movements to increase ROM, strength, coordination, balance, and proprioception in order to improve patient's ability to progress to PLOF and address remaining functional goals. (see flow sheet as applicable)     Details if applicable:       14  76878 Neuromuscular Re-Education (timed):  improve balance, coordination, kinesthetic sense, posture, core stability and proprioception to improve patient's ability to develop conscious control of individual muscles and awareness of position of extremities in order to progress to PLOF and address remaining functional goals.  (see flow sheet as applicable)     Details if applicable:            Details if applicable:            Details if applicable:            Details if applicable:     29  [facility] Totals Reminder: bill using total billable min of TIMED therapeutic procedures (example: do not include dry needle or estim unattended, both untimed codes, in totals to left)  8-22 min = 1 unit; 23-37 min = 2 units; 38-52 min = 3 units; 53-67 min = 4 units; 68-82 min = 5 units   Total Total     [x]  Patient Education billed concurrently with other procedures   [x] Review HEP    [] Progressed/Changed HEP, detail:    [] Other detail:       Objective Information/Functional Measures/Assessment  Patient has completed 7 Physical Therapy visits for muscle weakness/gait abnormality. Patient reports adherence to HEP and noticing small functional differences. Patient demonstrates improvement in functional LE strength (30 x sit to stand test), LE strength (MMT), static balance (4 stage balance test), and decreasing fall risk during functional activity (TUG). Patient continues to demonstrate deficits in functional LE strength, balance, and mobility. She has made good progress towards her goals, but has not yet met them. Patient will benefit from continued skilled Physical Therapy intervention to address remaining deficits and achieve goals to maximize function. Patient will continue to benefit from skilled PT / OT services to modify and progress therapeutic interventions, analyze and address functional mobility deficits, analyze and address ROM deficits, analyze and address strength deficits, analyze and address soft tissue restrictions, analyze and cue for proper movement patterns, analyze and modify for postural abnormalities, analyze and address imbalance/dizziness, and instruct in home and community integration to address functional deficits and attain remaining goals. Progress toward goals / Updated goals:  []  See Progress Note/Recertification    Short Term Goals: To be accomplished in 4 weeks:                   Patient will report compliance with HEP at least 1x/day to aid in rehabilitation program.                   Status at IE: Patient instructed in and provided written copy of initial Home Exercise Program.                   Current: 2/16/23: in-progress- performing HEP 1x/day                      Patient will decrease TUG by 10 seconds to demonstrate increased safety in mobility.                    Status at IE: 26.5 seconds                   Current: 2/16/23: 11.7 seconds,  in-progress                      Patient will improve 30 second sit to stand score to 8 repetitions to demonstrate increased proximal bilateral LE strength and associated increased safety in mobility. Status at IE: 4 repetitions                   Current:  2/16/23: in-progress, 7 reps     Long Term Goals: To be accomplished in 8 weeks:                   Patient will increase strength to 4+/5 throughout Bilateral LEs to aid in return recreational activities and ADLs. Status at IE: left LE 3+/5, right  LE 4-/5                   Current: 2/16/23: left LE:4- to 4/5 right LE: 4/5 in progress     Patient will improve Four Stage Balance Test to Stage Three 10 seconds bilaterally  to demonstrate increased proximal bilateral LE balance capabilities and associated increased safety in mobility. Status at IE: Four Stage Balance Test Stage Three right 2 seconds, left Stage Two 4 seconds. Current: 2/16/23: Stage three: right: 7 seconds, left: 10 seconds in progress                       Patient will ambulate 1000ft on level surface with out device and normalized gait. Status at IE:wide based gait reaching for objects for support, decreased celestine and step length. Current: Same as IE                      Patient will improve FOTO (an established functional score where 100 = no disability) to 54 points overall to demonstrate improvement in functional ability.                    Status at IE:51                   Current: Same as IE    PLAN  Yes  Continue plan of care  []  Upgrade activities as tolerated  []  Discharge due to :  []  Other:    Kamari Castillo PT    2/16/2023    11:37 AM    Future Appointments   Date Time Provider Ameya Lockwood   2/21/2023 11:00 AM YOLIE Smith [facility]   2/24/2023 10:30 AM YOLIE Balbuena [facility]   2/28/2023  2:30 PM Gonsalo Sawant Ángel De [facility]

## 2023-02-16 NOTE — PROGRESS NOTES
[facility] at [address] [place], [address]  Ph [phone]  Fx [phone]    Physical Therapy Progress Note  Patient name: Ebony Sales Start of Care: 2023   Referral source: Kacy Arrieta DO : 1945               Medical Diagnosis: Muscle weakness (generalized) [M62.81]  Other abnormalities of gait and mobility [R26.89]    Onset Date:2022               Treatment Diagnosis: muscle weakness, abnormal gait   Prior Hospitalization: see medical history Provider#: [id number]   Medications: Verified on Patient summary List    Comorbidities: OA, low back pain, right knee pain. Prior Level of Function:  regular 45 minutes walks. Visits from Start of Care: 7    Missed Visits: 1    Updated Goals/Measure of Progress: To be achieved in 8 weeks:    Short Term Goals: To be accomplished in 4 weeks:                   Patient will report compliance with HEP at least 1x/day to aid in rehabilitation program.                   Status at IE: Patient instructed in and provided written copy of initial Home Exercise Program.                   Current: 23: in-progress- performing HEP 1x/day                      Patient will decrease TUG by 10 seconds to demonstrate increased safety in mobility. Status at IE: 26.5 seconds                   Current: 23: 11.7 seconds,  in-progress                      Patient will improve 30 second sit to stand score to 8 repetitions to demonstrate increased proximal bilateral LE strength and associated increased safety in mobility. Status at IE: 4 repetitions                   Current:  23: in-progress, 7 reps     Long Term Goals: To be accomplished in 8 weeks:                   Patient will increase strength to 4+/5 throughout Bilateral LEs to aid in return recreational activities and ADLs.                    Status at IE: left LE 3+/5, right  LE 4-/5                   Current: 23: left LE:4- to 4/5 right LE: 4/5 in progress     Patient will improve Four Stage Balance Test to Stage Three 10 seconds bilaterally  to demonstrate increased proximal bilateral LE balance capabilities and associated increased safety in mobility. Status at IE: Four Stage Balance Test Stage Three right 2 seconds, left Stage Two 4 seconds. Current: 2/16/23: Stage three: right: 7 seconds, left: 10 seconds in progress                       Patient will ambulate 1000ft on level surface with out device and normalized gait. Status at IE:wide based gait reaching for objects for support, decreased celestine and step length. Current: Same as IE                      Patient will improve FOTO (an established functional score where 100 = no disability) to 54 points overall to demonstrate improvement in functional ability. Status at IE:51                   Current: Same as IE    Summary of Care/ Key Functional Changes: Patient has completed 7 Physical Therapy visits for muscle weakness/gait abnormality. Patient reports adherence to HEP and noticing small functional differences. Patient demonstrates improvement in functional LE strength (30 x sit to stand test), LE strength (MMT), static balance (4 stage balance test), and decreasing fall risk during functional activity (TUG). Patient continues to demonstrate deficits in functional LE strength, balance, and mobility. She has made good progress towards her goals, but has not yet met them. Patient will benefit from continued skilled Physical Therapy intervention to address remaining deficits and achieve goals to maximize function.        ASSESSMENT/RECOMMENDATIONS:  [x]Continue therapy per initial plan/protocol at a frequency of  2 x per week for 8 weeks      Thank you for this referral.   Danna Hameed, PT 2/16/2023 11:06 AM

## 2023-02-21 ENCOUNTER — HOSPITAL ENCOUNTER (OUTPATIENT)
Facility: HOSPITAL | Age: 78
Setting detail: RECURRING SERIES
Discharge: HOME | End: 2023-02-24
Payer: MEDICARE

## 2023-02-21 ENCOUNTER — APPOINTMENT (OUTPATIENT)
Dept: PHYSICAL THERAPY | Age: 78
End: 2023-02-21
Payer: MEDICARE

## 2023-02-21 PROCEDURE — 97530 THERAPEUTIC ACTIVITIES: CPT

## 2023-02-21 PROCEDURE — 97112 NEUROMUSCULAR REEDUCATION: CPT

## 2023-02-21 PROCEDURE — 97110 THERAPEUTIC EXERCISES: CPT

## 2023-02-21 NOTE — PROGRESS NOTES
PHYSICAL / OCCUPATIONAL THERAPY - DAILY TREATMENT NOTE (updated )    Patient Name: Christine Fiore    Date: 2023    : 1945  Insurance: Payor: MEDICARE / Plan: MEDICARE PART A AND B / Product Type: *No Product type* /      Patient  verified Yes     Visit #   Current / Total 8 16   Time   In / Out 11:00 11:40   Pain   In / Out 4 2.5   Subjective Functional Status/Changes: Patient reports her right knee is hurting more today insidiously. Changes to:  Meds, Allergies, Med Hx, Sx Hx? If yes, update Summary List no       TREATMENT AREA =  No admission diagnoses are documented for this encounter. OBJECTIVE         Therapeutic Procedures: Tx Min Billable or 1:1 Min (if diff from Tx Min) Procedure, Rationale, Specifics   14  04893 Therapeutic Exercise (timed):  increase ROM, strength, coordination, balance, and proprioception to improve patient's ability to progress to PLOF and address remaining functional goals. (see flow sheet as applicable)     Details if applicable:       16  06161 Neuromuscular Re-Education (timed):  improve balance, coordination, kinesthetic sense, posture, core stability and proprioception to improve patient's ability to develop conscious control of individual muscles and awareness of position of extremities in order to progress to PLOF and address remaining functional goals. (see flow sheet as applicable)     Details if applicable:     10  81691 Therapeutic Activity (timed):  use of dynamic activities replicating functional movements to increase ROM, strength, coordination, balance, and proprioception in order to improve patient's ability to progress to PLOF and address remaining functional goals.   (see flow sheet as applicable)     Details if applicable:            Details if applicable:            Details if applicable:     36  [facility] Totals Reminder: bill using total billable min of TIMED therapeutic procedures (example: do not include dry needle or estim unattended, both untimed codes, in totals to left)  8-22 min = 1 unit; 23-37 min = 2 units; 38-52 min = 3 units; 53-67 min = 4 units; 68-82 min = 5 units   Total Total     [x]  Patient Education billed concurrently with other procedures   [x] Review HEP    [] Progressed/Changed HEP, detail:    [] Other detail:       Objective Information/Functional Measures/Assessment    Patient reports no adverse reactions to therapy. Skilled therapy intervention addressed LE strength and functional balance to improve safe ambulation. Hesistancy with decreased step length/confidence on left LE during walking drills. Patient overall decreasing need for UE usage for balance during exercises. Patient is making good progress towards goals and will benefit from continued therapy to achieve goals and maximize function/restore PLOF. Patient will continue to benefit from skilled PT / OT services to modify and progress therapeutic interventions, analyze and address functional mobility deficits, analyze and address ROM deficits, analyze and address strength deficits, analyze and address soft tissue restrictions, analyze and cue for proper movement patterns, analyze and modify for postural abnormalities, analyze and address imbalance/dizziness, and instruct in home and community integration to address functional deficits and attain remaining goals. Progress toward goals / Updated goals:  []  See Progress Note/Recertification    Short Term Goals: To be accomplished in 4 weeks:                   Patient will report compliance with HEP at least 1x/day to aid in rehabilitation program.                   Status at IE: Patient instructed in and provided written copy of initial Home Exercise Program.                   Last PN: 2/16/23: in-progress- performing HEP 1x/day                      Patient will decrease TUG by 10 seconds to demonstrate increased safety in mobility.                    Status at IE: 26.5 seconds                   Last PN 2/16/23: 11.7 seconds,  in-progress                      Patient will improve 30 second sit to stand score to 8 repetitions to demonstrate increased proximal bilateral LE strength and associated increased safety in mobility. Status at IE: 4 repetitions                   Last PN  2/16/23: in-progress, 7 reps     Long Term Goals: To be accomplished in 8 weeks:                   Patient will increase strength to 4+/5 throughout Bilateral LEs to aid in return recreational activities and ADLs. Status at IE: left LE 3+/5, right  LE 4-/5                   Last PN 2/16/23: left LE:4- to 4/5 right LE: 4/5 in progress     Patient will improve Four Stage Balance Test to Stage Three 10 seconds bilaterally  to demonstrate increased proximal bilateral LE balance capabilities and associated increased safety in mobility. Status at IE: Four Stage Balance Test Stage Three right 2 seconds, left Stage Two 4 seconds. Last PN 2/16/23: Stage three: right: 7 seconds, left: 10 seconds in progress                       Patient will ambulate 1000ft on level surface with out device and normalized gait. Status at IE:wide based gait reaching for objects for support, decreased celestine and step length. Last PN: 2/21/23: patient demonstrates less reaching for objects and increased confidence with walking, but continues to have decreased celestine and step length- no AD                      Patient will improve FOTO (an established functional score where 100 = no disability) to 54 points overall to demonstrate improvement in functional ability.                    Status at IE:51                   Last PN Same as IE    PLAN  Yes  Continue plan of care  []  Upgrade activities as tolerated  []  Discharge due to :  []  Other:    Franck Suggs, PT    2/21/2023    10:14 AM    Future Appointments   Date Time Provider Ameya Lockwood   2/21/2023 11:00 AM Franck Suggs, YOLIE SNYDER [facility]   2/24/2023 10:30 AM Millard Leventhal, PT [facility]   2/28/2023  2:30 PM YOLIE Odom [facility]

## 2023-02-24 ENCOUNTER — HOSPITAL ENCOUNTER (OUTPATIENT)
Facility: HOSPITAL | Age: 78
Setting detail: RECURRING SERIES
Discharge: HOME | End: 2023-02-27
Payer: MEDICARE

## 2023-02-24 ENCOUNTER — APPOINTMENT (OUTPATIENT)
Dept: PHYSICAL THERAPY | Age: 78
End: 2023-02-24
Payer: MEDICARE

## 2023-02-24 PROCEDURE — 97112 NEUROMUSCULAR REEDUCATION: CPT

## 2023-02-24 PROCEDURE — 97530 THERAPEUTIC ACTIVITIES: CPT

## 2023-02-24 PROCEDURE — 97110 THERAPEUTIC EXERCISES: CPT

## 2023-02-24 NOTE — PROGRESS NOTES
PHYSICAL / OCCUPATIONAL THERAPY - DAILY TREATMENT NOTE (updated )    Patient Name: Ted León    Date: 2023    : 1945  Insurance: Payor: MEDICARE / Plan: MEDICARE PART A AND B / Product Type: *No Product type* /      Patient  verified Yes     Visit #   Current / Total 9 16   Time   In / Out 1030 1123   Pain   In / Out 2 1-2   Subjective Functional Status/Changes: \"My right knee is giving me a bit of trouble but not as bad as it is sometimes. \"   Changes to:  Meds, Allergies, Med Hx, Sx Hx? If yes, update Summary List no       TREATMENT AREA =  No admission diagnoses are documented for this encounter. OBJECTIVE    Therapeutic Procedures: Tx Min Billable or 1:1 Min (if diff from Tx Min) Procedure, Rationale, Specifics   23 15 68230 Therapeutic Exercise (timed):  increase ROM, strength, coordination, balance, and proprioception to improve patient's ability to progress to PLOF and address remaining functional goals. (see flow sheet as applicable)     Details if applicable:       15  28133 Neuromuscular Re-Education (timed):  improve balance, coordination, kinesthetic sense, posture, core stability and proprioception to improve patient's ability to develop conscious control of individual muscles and awareness of position of extremities in order to progress to PLOF and address remaining functional goals. (see flow sheet as applicable)     Details if applicable:     15 10 33137 Therapeutic Activity (timed):  use of dynamic activities replicating functional movements to increase ROM, strength, coordination, balance, and proprioception in order to improve patient's ability to progress to PLOF and address remaining functional goals.   (see flow sheet as applicable)     Details if applicable:            Details if applicable:            Details if applicable:     48 40 [facility] Totals Reminder: bill using total billable min of TIMED therapeutic procedures (example: do not include dry needle or estim unattended, both untimed codes, in totals to left)  8-22 min = 1 unit; 23-37 min = 2 units; 38-52 min = 3 units; 53-67 min = 4 units; 68-82 min = 5 units   Total Total     [x]  Patient Education billed concurrently with other procedures   [x] Review HEP    [] Progressed/Changed HEP, detail:    [] Other detail:       Objective Information/Functional Measures/Assessment    Patient reporting increasing short term memory loss and did experience difficulty with recall of exercise instructions intermittently throughout session. Provided patience information on techniques to improve short term memory. Added more balance training to program with good tolerance noted. Patient will continue to benefit from skilled PT / OT services to modify and progress therapeutic interventions, analyze and address functional mobility deficits, analyze and address ROM deficits, analyze and address strength deficits, analyze and address soft tissue restrictions, analyze and cue for proper movement patterns, analyze and modify for postural abnormalities, analyze and address imbalance/dizziness, and instruct in home and community integration to address functional deficits and attain remaining goals. Progress toward goals / Updated goals:  []  See Progress Note/Recertification    Short Term Goals: To be accomplished in 4 weeks:                   Patient will report compliance with HEP at least 1x/day to aid in rehabilitation program.                   Status at IE: Patient instructed in and provided written copy of initial Home Exercise Program.                   Last PN: 2/16/23: in-progress- performing HEP 1x/day                      Patient will decrease TUG by 10 seconds to demonstrate increased safety in mobility.                    Status at IE: 26.5 seconds                   Last PN 2/16/23: 11.7 seconds,  in-progress                      Patient will improve 30 second sit to stand score to 8 repetitions to demonstrate increased proximal bilateral LE strength and associated increased safety in mobility. Status at IE: 4 repetitions                   Last PN  2/16/23: in-progress, 7 reps     Long Term Goals: To be accomplished in 8 weeks:                   Patient will increase strength to 4+/5 throughout Bilateral LEs to aid in return recreational activities and ADLs. Status at IE: left LE 3+/5, right  LE 4-/5                   Last PN 2/16/23: left LE:4- to 4/5 right LE: 4/5 in progress     Patient will improve Four Stage Balance Test to Stage Three 10 seconds bilaterally  to demonstrate increased proximal bilateral LE balance capabilities and associated increased safety in mobility. Status at IE: Four Stage Balance Test Stage Three right 2 seconds, left Stage Two 4 seconds. Current 2/24/23: Stage three: right: 8 seconds, left: 10 seconds in progress                       Patient will ambulate 1000ft on level surface with out device and normalized gait. Status at IE:wide based gait reaching for objects for support, decreased celestine and step length. Last PN: 2/21/23: patient demonstrates less reaching for objects and increased confidence with walking, but continues to have decreased celestine and step length- no AD                      Patient will improve FOTO (an established functional score where 100 = no disability) to 54 points overall to demonstrate improvement in functional ability.                    Status at IE:51                   Last PN Same as IE    PLAN  Yes  Continue plan of care  []  Upgrade activities as tolerated  []  Discharge due to :  []  Other:    Shilpi Lou PT    2/24/2023    10:36 AM    Future Appointments   Date Time Provider Ameya Lockwood   2/28/2023  2:30 PM YOLIE Bowling [facility]

## 2023-02-28 ENCOUNTER — HOSPITAL ENCOUNTER (OUTPATIENT)
Facility: HOSPITAL | Age: 78
Setting detail: RECURRING SERIES
Discharge: HOME | End: 2023-03-03
Payer: MEDICARE

## 2023-02-28 ENCOUNTER — APPOINTMENT (OUTPATIENT)
Dept: PHYSICAL THERAPY | Age: 78
End: 2023-02-28
Payer: MEDICARE

## 2023-02-28 PROCEDURE — 97530 THERAPEUTIC ACTIVITIES: CPT

## 2023-02-28 PROCEDURE — 97110 THERAPEUTIC EXERCISES: CPT

## 2023-02-28 NOTE — PROGRESS NOTES
PHYSICAL / OCCUPATIONAL THERAPY - DAILY TREATMENT NOTE (updated )    Patient Name: Zandra Dry    Date: 2023    : 1945  Insurance: Payor: MEDICARE / Plan: MEDICARE PART A AND B / Product Type: *No Product type* /      Patient  verified Yes     Visit #   Current / Total 10 16   Time   In / Out 1430 1509   Pain   In / Out 4 4   Subjective Functional Status/Changes: Reports slight increase in pain \"3 out of 6 days a week I have little to no pain, but some days like today it really bothers me\"    Changes to:  Meds, Allergies, Med Hx, Sx Hx? If yes, update Summary List no       TREATMENT AREA =  No admission diagnoses are documented for this encounter. OBJECTIVE         Therapeutic Procedures: Tx Min Billable or 1:1 Min (if diff from Tx Min) Procedure, Rationale, Specifics   29  85730 Therapeutic Exercise (timed):  increase ROM, strength, coordination, balance, and proprioception to improve patient's ability to progress to PLOF and address remaining functional goals. (see flow sheet as applicable)     Details if applicable:       10  56947 Therapeutic Activity (timed):  use of dynamic activities replicating functional movements to increase ROM, strength, coordination, balance, and proprioception in order to improve patient's ability to progress to PLOF and address remaining functional goals.   (see flow sheet as applicable)     Details if applicable:            Details if applicable:            Details if applicable:            Details if applicable:     44  [facility] Totals Reminder: bill using total billable min of TIMED therapeutic procedures (example: do not include dry needle or estim unattended, both untimed codes, in totals to left)  8-22 min = 1 unit; 23-37 min = 2 units; 38-52 min = 3 units; 53-67 min = 4 units; 68-82 min = 5 units   Total Total     [x]  Patient Education billed concurrently with other procedures   [x] Review HEP    [] Progressed/Changed HEP, detail:    [] Other detail: Objective Information/Functional Measures/Assessment    Patient tolerated treatment session well today. Patient had no complaints with addition of 4# to sit stands to exercise program to accomplish increased functional LE strength. Reports increased pain with bike. Patient continues to make steady progress toward goals and would benefit from continued skilled PT intervention to address remaining deficits outlined in goals below. Patient will continue to benefit from skilled PT / OT services to modify and progress therapeutic interventions, analyze and address functional mobility deficits, analyze and address ROM deficits, analyze and address strength deficits, analyze and address soft tissue restrictions, analyze and cue for proper movement patterns, and analyze and modify for postural abnormalities to address functional deficits and attain remaining goals. Progress toward goals / Updated goals:  []  See Progress Note/Recertification    Short Term Goals: To be accomplished in 4 weeks:                   Patient will report compliance with HEP at least 1x/day to aid in rehabilitation program.                   Status at IE: Patient instructed in and provided written copy of initial Home Exercise Program.                   Last PN: 2/16/23: in-progress- performing HEP 1x/day                      Patient will decrease TUG by 10 seconds to demonstrate increased safety in mobility. Status at IE: 26.5 seconds                   Last PN 2/16/23: 11.7 seconds,  in-progress                      Patient will improve 30 second sit to stand score to 8 repetitions to demonstrate increased proximal bilateral LE strength and associated increased safety in mobility. Status at IE: 4 repetitions                   Last PN  2/16/23: in-progress, 7 reps     Long Term Goals:  To be accomplished in 8 weeks:                   Patient will increase strength to 4+/5 throughout Bilateral LEs to aid in return recreational activities and ADLs. Status at IE: left LE 3+/5, right  LE 4-/5                   Last PN 2/16/23: left LE:4- to 4/5 right LE: 4/5 in progress     Patient will improve Four Stage Balance Test to Stage Three 10 seconds bilaterally  to demonstrate increased proximal bilateral LE balance capabilities and associated increased safety in mobility. Status at IE: Four Stage Balance Test Stage Three right 2 seconds, left Stage Two 4 seconds. Current 2/24/23: Stage three: right: 8 seconds, left: 10 seconds in progress                       Patient will ambulate 1000ft on level surface with out device and normalized gait. Status at IE:wide based gait reaching for objects for support, decreased celestine and step length. Last PN: 2/21/23: patient demonstrates less reaching for objects and increased confidence with walking, but continues to have decreased celestine and step length- no AD                      Patient will improve FOTO (an established functional score where 100 = no disability) to 54 points overall to demonstrate improvement in functional ability.                    Status at IE:51                   Last PN Same as IE    PLAN  Yes  Continue plan of care  []  Upgrade activities as tolerated  []  Discharge due to :  []  Other:    Ave Blandon, LINDA    2/28/2023    1:55 PM    Future Appointments   Date Time Provider Ameya Lockwood   2/28/2023  2:30 PM [place] [facility]   3/2/2023  5:00 PM YOLIE FarrHPTINDIA [facility]   3/10/2023  2:30 PM YOLIE ChavezTINDIA [facility]   3/13/2023  9:30 AM YOLIE ChavezHPTINDIA [facility]   3/21/2023  3:00 PM YOLIE FarrHPTINDIA [facility]   3/23/2023 11:30 AM YOLIE FarrHPTINDIA [facility]   3/27/2023  3:30 PM YOLIE ChavezHPTINDIA [facility]

## 2023-03-02 ENCOUNTER — HOSPITAL ENCOUNTER (OUTPATIENT)
Facility: HOSPITAL | Age: 78
Setting detail: RECURRING SERIES
Discharge: HOME | End: 2023-03-05
Payer: MEDICARE

## 2023-03-02 PROCEDURE — 97112 NEUROMUSCULAR REEDUCATION: CPT

## 2023-03-02 PROCEDURE — 97110 THERAPEUTIC EXERCISES: CPT

## 2023-03-02 NOTE — PROGRESS NOTES
PHYSICAL / OCCUPATIONAL THERAPY - DAILY TREATMENT NOTE (updated )    Patient Name: Deangelo Fountain    Date: 3/2/2023    : 1945  Insurance: Payor: MEDICARE / Plan: MEDICARE PART A AND B / Product Type: *No Product type* /      Patient  verified Yes     Visit #   Current / Total 11 16   Time   In / Out 5:00 5:35   Pain   In / Out 2 1-2   Subjective Functional Status/Changes: Patient reports her ear infection was caused by a fungus and is recovering well. Able to wear earring aids today,    Changes to:  Meds, Allergies, Med Hx, Sx Hx? If yes, update Summary List no       TREATMENT AREA =  No admission diagnoses are documented for this encounter. OBJECTIVE         Therapeutic Procedures: Tx Min Billable or 1:1 Min (if diff from Tx Min) Procedure, Rationale, Specifics   16  60672 Therapeutic Exercise (timed):  increase ROM, strength, coordination, balance, and proprioception to improve patient's ability to progress to PLOF and address remaining functional goals. (see flow sheet as applicable)     Details if applicable:       19  86624 Neuromuscular Re-Education (timed):  improve balance, coordination, kinesthetic sense, posture, core stability and proprioception to improve patient's ability to develop conscious control of individual muscles and awareness of position of extremities in order to progress to PLOF and address remaining functional goals.  (see flow sheet as applicable)     Details if applicable:            Details if applicable:            Details if applicable:            Details if applicable:     28  [facility] Totals Reminder: bill using total billable min of TIMED therapeutic procedures (example: do not include dry needle or estim unattended, both untimed codes, in totals to left)  8-22 min = 1 unit; 23-37 min = 2 units; 38-52 min = 3 units; 53-67 min = 4 units; 68-82 min = 5 units   Total Total     [x]  Patient Education billed concurrently with other procedures   [x] Review HEP    [] Progressed/Changed HEP, detail:    [] Other detail:       Objective Information/Functional Measures/Assessment    Patient reports no adverse reactions to therapy. Skilled therapy intervention addressed dynamic functional balance and functional activities. Patient demonstrates good balance during head turns and speed changes with mild veers to right with looking right. No LOB. Demonstrates improved functional LE strength with 30 seconds sit to stand test results and improved FOTO score. Patient is making good progress towards goals and will benefit from continued therapy to achieve goals and maximize function/restore PLOF. Patient will continue to benefit from skilled PT / OT services to modify and progress therapeutic interventions, analyze and address functional mobility deficits, analyze and address ROM deficits, analyze and address strength deficits, analyze and address soft tissue restrictions, analyze and cue for proper movement patterns, analyze and modify for postural abnormalities, analyze and address imbalance/dizziness, and instruct in home and community integration to address functional deficits and attain remaining goals. Progress toward goals / Updated goals:  []  See Progress Note/Recertification    Short Term Goals: To be accomplished in 4 weeks:                   Patient will report compliance with HEP at least 1x/day to aid in rehabilitation program.                   Status at IE: Patient instructed in and provided written copy of initial Home Exercise Program.                   Last PN: 2/16/23: in-progress- performing HEP 1x/day                      Patient will decrease TUG by 10 seconds to demonstrate increased safety in mobility.                    Status at IE: 26.5 seconds                   Last PN 2/16/23: 11.7 seconds,  in-progress                      Patient will improve 30 second sit to stand score to 8 repetitions to demonstrate increased proximal bilateral LE strength and associated increased safety in mobility. Status at IE: 4 repetitions                   Last PN  3/2/23: 8.5x MET     Long Term Goals: To be accomplished in 8 weeks:                   Patient will increase strength to 4+/5 throughout Bilateral LEs to aid in return recreational activities and ADLs. Status at IE: left LE 3+/5, right  LE 4-/5                   Last PN 2/16/23: left LE:4- to 4/5 right LE: 4/5 in progress     Patient will improve Four Stage Balance Test to Stage Three 10 seconds bilaterally  to demonstrate increased proximal bilateral LE balance capabilities and associated increased safety in mobility. Status at IE: Four Stage Balance Test Stage Three right 2 seconds, left Stage Two 4 seconds. Current 2/24/23: Stage three: right: 8 seconds, left: 10 seconds in progress                       Patient will ambulate 1000ft on level surface with out device and normalized gait. Status at IE:wide based gait reaching for objects for support, decreased celestine and step length. Last PN: 2/21/23: patient demonstrates less reaching for objects and increased confidence with walking, but continues to have decreased celestine and step length- no AD                      Patient will improve FOTO (an established functional score where 100 = no disability) to 54 points overall to demonstrate improvement in functional ability.                    Status at IE:51                   Last PN: 3/2/23: 62 MET    PLAN  Yes  Continue plan of care  []  Upgrade activities as tolerated  []  Discharge due to :  []  Other:    Colvin Bosworth, PT    3/2/2023    1:18 PM    Future Appointments   Date Time Provider Ameya Lockwood   3/2/2023  5:00 PM Colvin Bosworth, PT [facility]   3/10/2023  2:30 PM Ann Burdick, YOLIE SNYDER [facility]   3/13/2023  9:30 AM YOLIE Cervantes [facility]   3/21/2023  3:00 PM Colvin Bosworth, PT [facility]   3/23/2023 11:30 AM YOLIE Azar [facility]   3/27/2023  3:30 PM YOLIE Thomas [facility]

## 2023-03-10 ENCOUNTER — HOSPITAL ENCOUNTER (OUTPATIENT)
Facility: HOSPITAL | Age: 78
Setting detail: RECURRING SERIES
Discharge: HOME | End: 2023-03-13
Payer: MEDICARE

## 2023-03-10 ENCOUNTER — APPOINTMENT (OUTPATIENT)
Facility: HOSPITAL | Age: 78
End: 2023-03-10
Payer: MEDICARE

## 2023-03-10 PROCEDURE — 97110 THERAPEUTIC EXERCISES: CPT

## 2023-03-10 PROCEDURE — 97530 THERAPEUTIC ACTIVITIES: CPT

## 2023-03-10 PROCEDURE — 97112 NEUROMUSCULAR REEDUCATION: CPT

## 2023-03-10 NOTE — PROGRESS NOTES
PHYSICAL / OCCUPATIONAL THERAPY - DAILY TREATMENT NOTE (updated )    Patient Name: Christine Fiore    Date: 3/10/2023    : 1945  Insurance: Payor: MEDICARE / Plan: MEDICARE PART A AND B / Product Type: *No Product type* /      Patient  verified Yes     Visit #   Current / Total 12 16   Time   In / Out 1429 1515   Pain   In / Out Lumbar 3 3   Subjective Functional Status/Changes: \"My walking is getting much better. I don't have to hold onto things for support as much as I used to do. \"   Changes to:  Meds, Allergies, Med Hx, Sx Hx? If yes, update Summary List no       TREATMENT AREA =  Muscle weakness (generalized) [M62.81]  Other abnormalities of gait and mobility [R26.89]    OBJECTIVE    Therapeutic Procedures: Tx Min Billable or 1:1 Min (if diff from Tx Min) Procedure, Rationale, Specifics   21 15 53812 Therapeutic Exercise (timed):  increase ROM, strength, coordination, balance, and proprioception to improve patient's ability to progress to PLOF and address remaining functional goals. (see flow sheet as applicable)     Details if applicable:       15  95486 Neuromuscular Re-Education (timed):  improve balance, coordination, kinesthetic sense, posture, core stability and proprioception to improve patient's ability to develop conscious control of individual muscles and awareness of position of extremities in order to progress to PLOF and address remaining functional goals. (see flow sheet as applicable)     Details if applicable:     10  06166 Therapeutic Activity (timed):  use of dynamic activities replicating functional movements to increase ROM, strength, coordination, balance, and proprioception in order to improve patient's ability to progress to PLOF and address remaining functional goals.   (see flow sheet as applicable)     Details if applicable:            Details if applicable:            Details if applicable:     55 40 [facility] Totals Reminder: bill using total billable min of TIMED therapeutic procedures (example: do not include dry needle or estim unattended, both untimed codes, in totals to left)  8-22 min = 1 unit; 23-37 min = 2 units; 38-52 min = 3 units; 53-67 min = 4 units; 68-82 min = 5 units   Total Total     [x]  Patient Education billed concurrently with other procedures   [x] Review HEP    [] Progressed/Changed HEP, detail:    [] Other detail:       Objective Information/Functional Measures/Assessment    Patient reports high consistency with HEP and is noting improved ability to ambulate without requiring assistive device during community ambulation. Provided patient level 2 (orange resistance band to utilize in HEP. Patient will continue to benefit from skilled PT / OT services to modify and progress therapeutic interventions, analyze and address functional mobility deficits, analyze and address ROM deficits, analyze and address strength deficits, analyze and address soft tissue restrictions, analyze and cue for proper movement patterns, analyze and modify for postural abnormalities, analyze and address imbalance/dizziness, and instruct in home and community integration to address functional deficits and attain remaining goals. Progress toward goals / Updated goals:  []  See Progress Note/Recertification    Short Term Goals: To be accomplished in 4 weeks:                   Patient will report compliance with HEP at least 1x/day to aid in rehabilitation program.                   Status at IE: Patient instructed in and provided written copy of initial Home Exercise Program.                   Last PN: 2/16/23: in-progress- performing HEP 1x/day                      Patient will decrease TUG by 10 seconds to demonstrate increased safety in mobility.                    Status at IE: 26.5 seconds                   Last PN 2/16/23: 11.7 seconds,  in-progress                      Patient will improve 30 second sit to stand score to 8 repetitions to demonstrate increased proximal bilateral LE strength and associated increased safety in mobility. Status at IE: 4 repetitions                   Last PN  3/2/23: 8.5x MET     Long Term Goals: To be accomplished in 8 weeks:                   Patient will increase strength to 4+/5 throughout Bilateral LEs to aid in return recreational activities and ADLs. Status at IE: left LE 3+/5, right  LE 4-/5                   Current: 3/10/23: left LE 4/5,  right LE: 4+/5  in progress           Patient will improve Four Stage Balance Test to Stage Three 10 seconds bilaterally  to demonstrate increased proximal bilateral LE balance capabilities and associated increased safety in mobility. Status at IE: Four Stage Balance Test Stage Three right 2 seconds, left Stage Two 4 seconds. Current 2/24/23: Stage three: right: 8 seconds, left: 10 seconds in progress                       Patient will ambulate 1000ft on level surface with out device and normalized gait. Status at IE:wide based gait reaching for objects for support, decreased celestine and step length. Last PN: 2/21/23: patient demonstrates less reaching for objects and increased confidence with walking, but continues to have decreased celestine and step length- no AD                      Patient will improve FOTO (an established functional score where 100 = no disability) to 54 points overall to demonstrate improvement in functional ability.                    Status at IE:51                   Last PN: 3/2/23: 62 MET    PLAN  Yes  Continue plan of care  []  Upgrade activities as tolerated  []  Discharge due to :  []  Other:    Dav Dobson PT    3/10/2023    2:33 PM    Future Appointments   Date Time Provider Ameya Lockwood   3/13/2023  9:30 AM YOLIE Tanner [facility]   3/21/2023  3:00 PM YOLIE Gu [facility]   3/23/2023 11:30 AM YOLIE Gu [facility]   3/27/2023  3:30 PM Abimbola Nash [facility]

## 2023-03-13 ENCOUNTER — HOSPITAL ENCOUNTER (OUTPATIENT)
Facility: HOSPITAL | Age: 78
Setting detail: RECURRING SERIES
Discharge: HOME | End: 2023-03-16
Payer: MEDICARE

## 2023-03-13 PROCEDURE — 97110 THERAPEUTIC EXERCISES: CPT

## 2023-03-13 PROCEDURE — 97112 NEUROMUSCULAR REEDUCATION: CPT

## 2023-03-13 PROCEDURE — 97530 THERAPEUTIC ACTIVITIES: CPT

## 2023-03-13 NOTE — PROGRESS NOTES
PHYSICAL / OCCUPATIONAL THERAPY - DAILY TREATMENT NOTE (updated )    Patient Name: Dereje Hayes    Date: 3/13/2023    : 1945  Insurance: Payor: MEDICARE / Plan: MEDICARE PART A AND B / Product Type: *No Product type* /      Patient  verified Yes     Visit #   Current / Total 13 16   Time   In / Out 0935 1042   Pain   In / Out 5 3   Subjective Functional Status/Changes: \"The rainy, cold weather is flaring up my low back pain. I am concerned it will get in the way of my being able to do the balance activities. \"   Changes to:  Meds, Allergies, Med Hx, Sx Hx? If yes, update Summary List no       TREATMENT AREA =  Muscle weakness (generalized) [M62.81]  Other abnormalities of gait and mobility [R26.89]    OBJECTIVE    Therapeutic Procedures: Tx Min Billable or 1:1 Min (if diff from Tx Min) Procedure, Rationale, Specifics   37 30 25483 Therapeutic Exercise (timed):  increase ROM, strength, coordination, balance, and proprioception to improve patient's ability to progress to PLOF and address remaining functional goals. (see flow sheet as applicable)     Details if applicable:       15  05085 Neuromuscular Re-Education (timed):  improve balance, coordination, kinesthetic sense, posture, core stability and proprioception to improve patient's ability to develop conscious control of individual muscles and awareness of position of extremities in order to progress to PLOF and address remaining functional goals. (see flow sheet as applicable)     Details if applicable:     15  98494 Therapeutic Activity (timed):  use of dynamic activities replicating functional movements to increase ROM, strength, coordination, balance, and proprioception in order to improve patient's ability to progress to PLOF and address remaining functional goals.   (see flow sheet as applicable)     Details if applicable:            Details if applicable:            Details if applicable:     79 60 [facility] Totals Reminder: bill using total billable min of TIMED therapeutic procedures (example: do not include dry needle or estim unattended, both untimed codes, in totals to left)  8-22 min = 1 unit; 23-37 min = 2 units; 38-52 min = 3 units; 53-67 min = 4 units; 68-82 min = 5 units   Total Total     [x]  Patient Education billed concurrently with other procedures   [x] Review HEP    [] Progressed/Changed HEP, detail:    [] Other detail:       Objective Information/Functional Measures/Assessment    Patient has been well motivated, consistent and diligent with PT and HEP and as a result is making steady progress towards goals established with patient at initial evaluation. Bilateral LE strength and gait pattern and celestine are improving. Patient does have difficulty with intermittent episodes of increased low back pain and has been instructed in home exercises to assist in management of chronic low back pain. Patient steadily progressing to more advanced balance activities and will be ready to transition to independent self care after approximately three more sessions. Patient will continue to benefit from skilled PT / OT services to modify and progress therapeutic interventions, analyze and address functional mobility deficits, analyze and address ROM deficits, analyze and address strength deficits, analyze and address soft tissue restrictions, analyze and cue for proper movement patterns, analyze and modify for postural abnormalities, analyze and address imbalance/dizziness, and instruct in home and community integration to address functional deficits and attain remaining goals. Progress toward goals / Updated goals:  [x]  See Progress Note/Recertification    Short Term Goals:  To be accomplished in 4 weeks:                   Patient will report compliance with HEP at least 1x/day to aid in rehabilitation program.                   Status at IE: Patient instructed in and provided written copy of initial Home Exercise Program. Current: 3/13/23: Patient now independent and consistent with full HEP. MET                      Patient will decrease TUG by 10 seconds to demonstrate increased safety in mobility. Status at IE: 26.5 seconds                   Last PN 3/13/23: 11.5 seconds,  in-progress                      Patient will improve 30 second sit to stand score to 8 repetitions to demonstrate increased proximal bilateral LE strength and associated increased safety in mobility. Status at IE: 4 repetitions                   Last PN  3/2/23: 8.5x MET     Long Term Goals: To be accomplished in 8 weeks:                   Patient will increase strength to 4+/5 throughout Bilateral LEs to aid in return recreational activities and ADLs. Status at IE: left LE 3+/5, right  LE 4-/5                   Current: 3/10/23: left LE 4/5,  right LE: 4+/5   in progress           Patient will improve Four Stage Balance Test to Stage Three 10 seconds bilaterally  to demonstrate increased proximal bilateral LE balance capabilities and associated increased safety in mobility. Status at IE: Four Stage Balance Test Stage Three right 2 seconds, left Stage Two 4 seconds. Current 3/13/23: Stage three: right: 9 seconds, left: 10 seconds in progress                       Patient will ambulate 1000ft on level surface with out device and normalized gait. Status at IE:wide based gait reaching for objects for support, decreased celestine and step length. Current: 3/13/23: gait celestine and step length progressing towards normal without use of assistive device. Patient will improve FOTO (an established functional score where 100 = no disability) to 54 points overall to demonstrate improvement in functional ability.                    Status at IE:51                   Last PN: 3/2/23: 62 MET    PLAN  Yes  Continue plan of care  [] Upgrade activities as tolerated  []  Discharge due to :  []  Other:    Junaid Galindo, PT    3/13/2023    9:47 AM    Future Appointments   Date Time Provider Ameya Lockwood   3/21/2023  3:00 PM YOLIE Long [facility]   3/23/2023 11:30 AM YOLIE Long [facility]   3/27/2023  3:30 PM YOLIE Baez [facility]

## 2023-03-13 NOTE — PROGRESS NOTES
[facility] at [address] [place] [address] Drive: [phone]   Fax: [phone]  Progress Note  Patient name: George Alonso Start of Care: 2023   Referral source: Ena Forman  : 1945               Medical Diagnosis: Muscle weakness (generalized) [M62.81]  Other abnormalities of gait and mobility [R26.89]    Onset Date:2022               Treatment Diagnosis: muscle weakness, abnormal gait   Prior Hospitalization: see medical history Provider#: [id number]   Medications: Verified on Patient summary List      ===========================================================================================  Assessment / Summary of Care:  George Alonso is a 68 y.o.  female who has been well motivated, consistent and diligent with PT and HEP and as a result is making steady progress towards goals established with patient at initial evaluation. Bilateral LE strength and gait pattern and celestine are improving. Patient does have difficulty with intermittent episodes of increased low back pain and has been instructed in home exercises to assist in management of chronic low back pain. Patient steadily progressing to more advanced balance activities and will be ready to transition to independent self care after approximately three more sessions.      Patient will continue to benefit from skilled PT / OT services to modify and progress therapeutic interventions, analyze and address functional mobility deficits, analyze and address ROM deficits, analyze and address strength deficits, analyze and address soft tissue restrictions, analyze and cue for proper movement patterns, analyze and modify for postural abnormalities, analyze and address imbalance/dizziness, and instruct in home and community integration to address functional deficits and attain remaining goals.    ===========================================================================================    Plan:Continue therapy per initial plan/protocol at a frequency of  2 x per week for 2 weeks    Progress Towards Goals:   Short Term Goals: To be accomplished in 4 weeks:                   Patient will report compliance with HEP at least 1x/day to aid in rehabilitation program.                   Status at IE: Patient instructed in and provided written copy of initial Home Exercise Program.                   Current: 3/13/23: Patient now independent and consistent with full HEP. MET                      Patient will decrease TUG by 10 seconds to demonstrate increased safety in mobility. Status at IE: 26.5 seconds                   Last PN 3/13/23: 11.5 seconds,  in-progress                      Patient will improve 30 second sit to stand score to 8 repetitions to demonstrate increased proximal bilateral LE strength and associated increased safety in mobility. Status at IE: 4 repetitions                   Last PN  3/2/23: 8.5x MET     Long Term Goals: To be accomplished in 8 weeks:                   Patient will increase strength to 4+/5 throughout Bilateral LEs to aid in return recreational activities and ADLs. Status at IE: left LE 3+/5, right  LE 4-/5                   Current: 3/10/23: left LE 4/5,  right LE: 4+/5   in progress           Patient will improve Four Stage Balance Test to Stage Three 10 seconds bilaterally  to demonstrate increased proximal bilateral LE balance capabilities and associated increased safety in mobility. Status at IE: Four Stage Balance Test Stage Three right 2 seconds, left Stage Two 4 seconds. Current 3/13/23: Stage three: right: 9 seconds, left: 10 seconds in progress                       Patient will ambulate 1000ft on level surface with out device and normalized gait. Status at IE:wide based gait reaching for objects for support, decreased celestine and step length. Current: 3/13/23: gait celestine and step length progressing towards normal without use of assistive device. Patient will improve FOTO (an established functional score where 100 = no disability) to 54 points overall to demonstrate improvement in functional ability. Status at IE:51                   Last PN: 3/2/23: 62 MET      ===========================================================================================  Subjective: \"The rainy, cold weather is flaring up my low back pain. I am concerned it will get in the way of my being able to do the balance activities. \"        Therapist Signature: Gwen Holcomb PT, DPT Date: 8/30/7245   Re-Certification: NA Time: 10:32 AM         [facility] at [address]                    Phone: [phone]   Fax: [phone]  .

## 2023-03-21 ENCOUNTER — HOSPITAL ENCOUNTER (OUTPATIENT)
Facility: HOSPITAL | Age: 78
Setting detail: RECURRING SERIES
Discharge: HOME | End: 2023-03-24
Payer: MEDICARE

## 2023-03-21 PROCEDURE — 97112 NEUROMUSCULAR REEDUCATION: CPT

## 2023-03-21 PROCEDURE — 97110 THERAPEUTIC EXERCISES: CPT

## 2023-03-21 NOTE — PROGRESS NOTES
Progressed/Changed HEP, detail:    [] Other detail:       Objective Information/Functional Measures/Assessment  Patient reports no adverse reactions to therapy. Skilled therapy intervention addressed dynamic balance and LE strengthening. Demonstrates good nearly independent balance recovery during tandem walking, although patient reports increased confidence with therapist use of gait belt. Decreased stair height utilized due to right knee pain this session. Patient is making good progress towards goals and will benefit from continued therapy to achieve goals and maximize function/restore PLOF. Patient steadily progressing to more advanced balance activities and will be ready to transition to independent self care after approximately three more sessions. Patient will continue to benefit from skilled PT / OT services to modify and progress therapeutic interventions, analyze and address functional mobility deficits, analyze and address ROM deficits, analyze and address strength deficits, analyze and address soft tissue restrictions, analyze and cue for proper movement patterns, analyze and modify for postural abnormalities, analyze and address imbalance/dizziness, and instruct in home and community integration to address functional deficits and attain remaining goals. Progress toward goals / Updated goals:  []  See Progress Note/Recertification    Short Term Goals: To be accomplished in 4 weeks:                   Patient will report compliance with HEP at least 1x/day to aid in rehabilitation program.                   Status at IE: Patient instructed in and provided written copy of initial Home Exercise Program.                   Current: 3/13/23: Patient now independent and consistent with full HEP. MET                      Patient will decrease TUG by 10 seconds to demonstrate increased safety in mobility.                    Status at IE: 26.5 seconds                   Last PN 3/13/23: 11.5 seconds,

## 2023-03-23 ENCOUNTER — HOSPITAL ENCOUNTER (OUTPATIENT)
Facility: HOSPITAL | Age: 78
Setting detail: RECURRING SERIES
Discharge: HOME | End: 2023-03-26
Payer: MEDICARE

## 2023-03-23 PROCEDURE — 97112 NEUROMUSCULAR REEDUCATION: CPT

## 2023-03-23 PROCEDURE — 97110 THERAPEUTIC EXERCISES: CPT

## 2023-03-23 NOTE — PROGRESS NOTES
PHYSICAL / OCCUPATIONAL THERAPY - DAILY TREATMENT NOTE (updated )    Patient Name: Ely Roles    Date: 3/23/2023    : 1945  Insurance: Payor: MEDICARE / Plan: MEDICARE PART A AND B / Product Type: *No Product type* /      Patient  verified Yes     Visit #   Current / Total 14 6   Time   In / Out 11:30 12:05   Pain   In / Out 2 2   Subjective Functional Status/Changes: Patient reports she went for a 16 minute walk yesterday outside, walking slow. No difficulty or imbalance. Changes to:  Meds, Allergies, Med Hx, Sx Hx? If yes, update Summary List no       TREATMENT AREA =  Muscle weakness (generalized) [M62.81]  Other abnormalities of gait and mobility [R26.89]    OBJECTIVE         Therapeutic Procedures: Tx Min Billable or 1:1 Min (if diff from Tx Min) Procedure, Rationale, Specifics   14  74168 Therapeutic Exercise (timed):  increase ROM, strength, coordination, balance, and proprioception to improve patient's ability to progress to PLOF and address remaining functional goals. (see flow sheet as applicable)     Details if applicable:       21  04009 Neuromuscular Re-Education (timed):  improve balance, coordination, kinesthetic sense, posture, core stability and proprioception to improve patient's ability to develop conscious control of individual muscles and awareness of position of extremities in order to progress to PLOF and address remaining functional goals.  (see flow sheet as applicable)     Details if applicable:            Details if applicable:            Details if applicable:            Details if applicable:     22  [facility] BC Totals Reminder: bill using total billable min of TIMED therapeutic procedures (example: do not include dry needle or estim unattended, both untimed codes, in totals to left)  8-22 min = 1 unit; 23-37 min = 2 units; 38-52 min = 3 units; 53-67 min = 4 units; 68-82 min = 5 units   Total Total     [x]  Patient Education billed concurrently with other procedures

## 2023-03-27 ENCOUNTER — HOSPITAL ENCOUNTER (OUTPATIENT)
Facility: HOSPITAL | Age: 78
Setting detail: RECURRING SERIES
Discharge: HOME | End: 2023-03-30
Payer: MEDICARE

## 2023-03-27 PROCEDURE — 97110 THERAPEUTIC EXERCISES: CPT

## 2023-03-27 PROCEDURE — 97112 NEUROMUSCULAR REEDUCATION: CPT

## 2023-03-27 NOTE — PROGRESS NOTES
Physical Therapy Discharge Instructions    [facility] [place] [facility] [place] [address] [place] [address]  Phone: [phone]   Fax: [phone]      Patient: Shira Quiroz  : 1945    Continue Home Exercise Program 1-2 times per day. Continue with    [x] Ice  as needed      [x] Heat           Follow up with MD:     [] Upon completion of therapy     [x] As needed      Recommendations:     [x]   Return to activity with home program    []   Return to activity with the following modifications:       []Post Rehab Program    []Join Independent aquatic program     []Return to/join local gym      Additional Comments: Please contact clinic at above phone number if you have any questions regarding Home Exercise Program or self care instructions.
demonstrate increased proximal bilateral LE balance capabilities and associated increased safety in mobility. Status at IE: Four Stage Balance Test Stage Three right 2 seconds, left Stage Two 4 seconds. Current: Stage three: right: 10 seconds, left: 10 seconds  Met 3/27/2023                       Patient will ambulate 1000ft on level surface with out device and normalized gait. Status at IE:wide based gait reaching for objects for support, decreased celestine and step length. Current:3/23/23: patient able to walk over obstacles with minimal hesitation/ change in gait pattern progressing                      Patient will improve FOTO (an established functional score where 100 = no disability) to 54 points overall to demonstrate improvement in functional ability. Status at IE:51                   Last PN: 3/2/23: 62 MET      Assessment/ Summary of Care: Patient has progressed to the point she has met most balance, strength and endurance goals set for PT. She is now fully independent in and consistent with a comprehensive HEP and is ready to discontinue PT and continue with independent self care.       RECOMMENDATIONS:  [x]Discontinue therapy: [x]Patient has reached or is progressing toward set goals      []Patient is non-compliant or has abdicated      []Due to lack of appreciable progress towards set goals    Valeria Yip, PT 3/27/2023 3:55 PM
will ambulate 1000ft on level surface with out device and normalized gait. Status at IE:wide based gait reaching for objects for support, decreased celestine and step length. Current:3/23/23: patient able to walk over obstacles with minimal hesitation/ change in gait pattern progressing                      Patient will improve FOTO (an established functional score where 100 = no disability) to 54 points overall to demonstrate improvement in functional ability. Status at IE:51                   Last PN: 3/2/23: 62 MET    PLAN    Continue plan of care  []  Upgrade activities as tolerated  [x]  Discharge due to : Patient ready to transition to independent self care. []  Other:    Isiah Gamez, PT    3/27/2023    3:39 PM    No future appointments.

## (undated) DEVICE — Device

## (undated) DEVICE — (D)STRIP SKN CLSR 0.5X4IN WHT --

## (undated) DEVICE — TIP FLO PHACO 30DEG CVD INF SL 20GA LAM

## (undated) DEVICE — SOLUTION IV STRL H2O 500 ML AQUALITE POUR BTL

## (undated) DEVICE — SATINSLIT® KNIFE 3.0MM ANGLED: Brand: SATINSLIT®

## (undated) DEVICE — DEVON™ KNEE AND BODY STRAP 60" X 3" (1.5 M X 7.6 CM): Brand: DEVON

## (undated) DEVICE — TOWEL SURG W16XL26IN BLU NONFENESTRATED DLX ST 2 PER PK